# Patient Record
Sex: FEMALE | Race: WHITE | NOT HISPANIC OR LATINO | Employment: UNEMPLOYED | URBAN - METROPOLITAN AREA
[De-identification: names, ages, dates, MRNs, and addresses within clinical notes are randomized per-mention and may not be internally consistent; named-entity substitution may affect disease eponyms.]

---

## 2020-09-08 ENCOUNTER — HOSPITAL ENCOUNTER (OUTPATIENT)
Dept: RADIOLOGY | Facility: MEDICAL CENTER | Age: 65
End: 2020-09-08
Payer: MEDICARE

## 2020-09-09 ENCOUNTER — HOSPITAL ENCOUNTER (OUTPATIENT)
Dept: RADIOLOGY | Facility: MEDICAL CENTER | Age: 65
End: 2020-09-09
Payer: MEDICARE

## 2020-09-09 ENCOUNTER — OFFICE VISIT (OUTPATIENT)
Dept: PULMONOLOGY | Facility: HOSPICE | Age: 65
End: 2020-09-09
Payer: MEDICARE

## 2020-09-09 ENCOUNTER — HOSPITAL ENCOUNTER (OUTPATIENT)
Dept: LAB | Facility: MEDICAL CENTER | Age: 65
End: 2020-09-09
Attending: INTERNAL MEDICINE
Payer: MEDICARE

## 2020-09-09 VITALS
OXYGEN SATURATION: 96 % | WEIGHT: 124 LBS | BODY MASS INDEX: 22.82 KG/M2 | HEIGHT: 62 IN | HEART RATE: 73 BPM | DIASTOLIC BLOOD PRESSURE: 72 MMHG | SYSTOLIC BLOOD PRESSURE: 106 MMHG

## 2020-09-09 DIAGNOSIS — Z87.09 HISTORY OF COPD: ICD-10-CM

## 2020-09-09 DIAGNOSIS — Z72.0 TOBACCO ABUSE DISORDER: ICD-10-CM

## 2020-09-09 DIAGNOSIS — R06.02 SHORTNESS OF BREATH: ICD-10-CM

## 2020-09-09 DIAGNOSIS — J96.11 CHRONIC HYPOXEMIC RESPIRATORY FAILURE (HCC): ICD-10-CM

## 2020-09-09 LAB
COVID ORDER STATUS COVID19: NORMAL
SARS-COV-2 RNA RESP QL NAA+PROBE: NOTDETECTED
SPECIMEN SOURCE: NORMAL

## 2020-09-09 PROCEDURE — 99358 PROLONG SERVICE W/O CONTACT: CPT | Mod: 25 | Performed by: INTERNAL MEDICINE

## 2020-09-09 PROCEDURE — 99204 OFFICE O/P NEW MOD 45 MIN: CPT | Mod: 25 | Performed by: INTERNAL MEDICINE

## 2020-09-09 PROCEDURE — 94761 N-INVAS EAR/PLS OXIMETRY MLT: CPT | Performed by: INTERNAL MEDICINE

## 2020-09-09 PROCEDURE — U0003 INFECTIOUS AGENT DETECTION BY NUCLEIC ACID (DNA OR RNA); SEVERE ACUTE RESPIRATORY SYNDROME CORONAVIRUS 2 (SARS-COV-2) (CORONAVIRUS DISEASE [COVID-19]), AMPLIFIED PROBE TECHNIQUE, MAKING USE OF HIGH THROUGHPUT TECHNOLOGIES AS DESCRIBED BY CMS-2020-01-R: HCPCS

## 2020-09-09 PROCEDURE — 99406 BEHAV CHNG SMOKING 3-10 MIN: CPT | Performed by: INTERNAL MEDICINE

## 2020-09-09 PROCEDURE — C9803 HOPD COVID-19 SPEC COLLECT: HCPCS

## 2020-09-09 RX ORDER — OMEPRAZOLE 40 MG/1
40 CAPSULE, DELAYED RELEASE ORAL 2 TIMES DAILY
COMMUNITY
End: 2023-08-07

## 2020-09-09 RX ORDER — NICOTINE 21 MG/24HR
1 PATCH, TRANSDERMAL 24 HOURS TRANSDERMAL EVERY 24 HOURS
Qty: 30 PATCH | Refills: 6 | Status: SHIPPED | OUTPATIENT
Start: 2020-09-09 | End: 2020-12-28

## 2020-09-09 RX ORDER — BUPROPION HYDROCHLORIDE 300 MG/1
300 TABLET ORAL EVERY MORNING
COMMUNITY
End: 2023-08-07

## 2020-09-09 RX ORDER — ROSUVASTATIN CALCIUM 10 MG/1
10 TABLET, COATED ORAL EVERY EVENING
COMMUNITY

## 2020-09-09 RX ORDER — ALENDRONATE SODIUM 70 MG/1
70 TABLET ORAL
COMMUNITY
End: 2023-08-07

## 2020-09-09 RX ORDER — LOSARTAN POTASSIUM 50 MG/1
50 TABLET ORAL DAILY
COMMUNITY
End: 2020-12-28

## 2020-09-09 ASSESSMENT — ENCOUNTER SYMPTOMS
SENSORY CHANGE: 0
WHEEZING: 0
STRIDOR: 0
COUGH: 0
HEADACHES: 0
EYE DISCHARGE: 0
CHILLS: 0
DIARRHEA: 0
SINUS PAIN: 0
MYALGIAS: 0
ABDOMINAL PAIN: 0
LOSS OF CONSCIOUSNESS: 0
VOMITING: 0
ORTHOPNEA: 0
SPUTUM PRODUCTION: 0
SHORTNESS OF BREATH: 1
DIZZINESS: 0
FEVER: 0
FOCAL WEAKNESS: 0
EYE PAIN: 0
SORE THROAT: 0
WEIGHT LOSS: 0
NAUSEA: 0
PSYCHIATRIC NEGATIVE: 1

## 2020-09-09 NOTE — ASSESSMENT & PLAN NOTE
- Patient was advised of importance of smoking cessation and counseled about the importance of smoking cessation for improved morbidity, mortality, and overall well-being. Patient was presented pharmacologic and non-pharmacologic methods for quitting.  - At this time, the patient is interested in quitting.  - already on wellbutrin  - rx nicoderm  - 8min spent counseling

## 2020-09-09 NOTE — ASSESSMENT & PLAN NOTE
- no prior PFTs, will obtain when acute issues stabilize  - imaging from [facility] requested  - resume symbicort, duonebs

## 2020-09-09 NOTE — PROGRESS NOTES
Pulmonary Clinic- Initial Consult    Date of Service: 9/9/2020    Referring Physician: García Todd M.D.    Reason for Consult: Dyspnea on exertion    Chief Complaint:   Chief Complaint   Patient presents with   • Establish Care     Referred by Dr García Todd for COPD/Dyspnea on Exertion   • Other     CXR 12/04/2019   • Letter for School/Work     To go back to work     HPI:   Estefany Cooper is a very pleasant 65 y.o. female active tobacco smoker 40 pack years, 1 PPD who is followed by García Todd M.D. and is referred to the pulmonary clinic for dyspnea on exertion.    Estefany had an ER visit 8/2020 at [facility] for shortness of breath.  Reportedly underwent a CT angiogram, labs and EKG and was told her shortness of breath was due to anxiety.  We have requested these records.  She was prescribed a nebulizer with duo nebs which she used several times with no symptomatic benefit.  Her symptoms eventually resolved until last week, when she developed worsening shortness of breath and cough.  Denies any fevers.  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      She carries a diagnosis of COPD, however has not had any prior PFTs.  She is previously used Symbicort and Ventolin with minimal symptomatic improvement, has been off both for over a year. She continues to smoke about 1 pack/day.      CXR 12/2019 reviewed, unremarkable lung parenchyma    Past medical history otherwise notable for essential hypertension, osteoporosis, history of hepatitis C, parathyroidectomy and GERD.    Functionally, Estefany states she is short of breath with minimal exertion, frequently having to stop to catch her breath at work. She has been excused from work but they are requesting that she returns Friday of this week.    Exacerbations within the past 12 months: 1    MMRC Grade: 3: Stops to catch breath on level ground after 100m  NYHA Class: III: Moderate symptoms with less than normal physical  "activity. Only comfortable at rest    Past Medical History:   Diagnosis Date   • Back pain    • Chickenpox    • COPD (chronic obstructive pulmonary disease) (HCC)    • [place] measles    • hepatitis c    • Hepatitis C 2012   • Hyperlipidemia    • Hypertension    • Insomnia    • Osteoporosis    • Psychiatric disorder     manic depresive   • Shortness of breath    • Thyroid disease 2012   • Weight loss        Past Surgical History:   Procedure Laterality Date   • CHOLECYSTECTOMY     • PARATHYROIDECTOMY     • OTHER ABDOMINAL SURGERY             Social History     Socioeconomic History   • Marital status:      Spouse name: Not on file   • Number of children: Not on file   • Years of education: Not on file   • Highest education level: Not on file   Occupational History   • Not on file   Social Needs   • Financial resource strain: Not on file   • Food insecurity     Worry: Not on file     Inability: Not on file   • Transportation needs     Medical: Not on file     Non-medical: Not on file   Tobacco Use   • Smoking status: Current Every Day Smoker     Packs/day: 1.00     Years: 40.00     Pack years: 40.00     Types: Cigarettes     Start date:    • Smokeless tobacco: Never Used   • Tobacco comment: 1.5 ppd stated up again   Substance and Sexual Activity   • Alcohol use: No     Comment: recovering alcoholic ()   • Drug use: Yes     Comment: \"overtake my clonipin\" noted 12; recovering herion, and pills 12 yrs-vicodin   • Sexual activity: Not on file   Lifestyle   • Physical activity     Days per week: Not on file     Minutes per session: Not on file   • Stress: Not on file   Relationships   • Social connections     Talks on phone: Not on file     Gets together: Not on file     Attends Jehovah's witness service: Not on file     Active member of club or organization: Not on file     Attends meetings of clubs or organizations: Not on file     Relationship status: Not on file   • Intimate " "partner violence     Fear of current or ex partner: Not on file     Emotionally abused: Not on file     Physically abused: Not on file     Forced sexual activity: Not on file   Other Topics Concern   • Not on file   Social History Narrative   • Not on file          History reviewed. No pertinent family history.    Current Outpatient Medications on File Prior to Visit   Medication Sig Dispense Refill   • losartan (COZAAR) 50 MG Tab Take 50 mg by mouth every day.     • rosuvastatin (CRESTOR) 10 MG Tab Take 10 mg by mouth every evening.     • buPROPion (WELLBUTRIN XL) 300 MG XL tablet Take 300 mg by mouth every morning.     • omeprazole (PRILOSEC) 40 MG delayed-release capsule Take 40 mg by mouth 2 times a day.     • alendronate (FOSAMAX) 70 MG Tab Take 70 mg by mouth every 7 days.       No current facility-administered medications on file prior to visit.        Allergies: Nkda [no known drug allergy], Percodan [oxycodone-aspirin], and Vicodin [hydrocodone-acetaminophen]      ROS:   Review of Systems   Constitutional: Negative for chills, fever and weight loss.   HENT: Negative for congestion, sinus pain and sore throat.    Eyes: Negative for pain and discharge.   Respiratory: Positive for shortness of breath. Negative for cough, sputum production, wheezing and stridor.    Cardiovascular: Negative for chest pain, orthopnea and leg swelling.   Gastrointestinal: Negative for abdominal pain, diarrhea, nausea and vomiting.   Genitourinary: Negative for dysuria, frequency and urgency.   Musculoskeletal: Negative for myalgias.   Skin: Negative for rash.   Neurological: Negative for dizziness, sensory change, focal weakness, loss of consciousness and headaches.   Psychiatric/Behavioral: Negative.    All other systems reviewed and are negative.    Vitals:  /72 (BP Location: Left arm, Patient Position: Sitting, BP Cuff Size: Adult)   Pulse 73   Ht 1.575 m (5' 2\")   Wt 56.2 kg (124 lb)   SpO2 96%     Physical " Exam:  Physical Exam  Vitals signs and nursing note reviewed.   Constitutional:       General: She is not in acute distress.     Appearance: Normal appearance. She is well-developed. She is not diaphoretic.   Eyes:      General: No scleral icterus.        Right eye: No discharge.         Left eye: No discharge.      Conjunctiva/sclera: Conjunctivae normal.      Pupils: Pupils are equal, round, and reactive to light.   Neck:      Thyroid: No thyromegaly.      Vascular: No JVD.   Cardiovascular:      Rate and Rhythm: Normal rate and regular rhythm.      Heart sounds: Normal heart sounds. No murmur. No gallop.    Pulmonary:      Effort: Pulmonary effort is normal. No respiratory distress.      Breath sounds: Normal breath sounds. No wheezing or rales.   Abdominal:      General: There is no distension.      Palpations: Abdomen is soft.      Tenderness: There is no abdominal tenderness. There is no guarding.   Musculoskeletal:         General: No tenderness.   Lymphadenopathy:      Cervical: No cervical adenopathy.   Skin:     General: Skin is warm.      Capillary Refill: Capillary refill takes less than 2 seconds.      Findings: No erythema or rash.   Neurological:      Mental Status: She is alert and oriented to person, place, and time.      Cranial Nerves: No cranial nerve deficit.      Sensory: No sensory deficit.      Motor: No abnormal muscle tone.   Psychiatric:         Behavior: Behavior normal.       Laboratory Data:    PFTs as reviewed by me personally show:  None for review at time of consultation    Imaging as reviewed by me personally show:    CXR 12/2019 reviewed, unremarkable lung parenchyma    Assessment/Plan:    Problem List Items Addressed This Visit     Shortness of breath     - chronic likely due to COPD, not on controller inhaler therapy  - acute due to exacerbation, cannot r/o covid at this time  - fortunately no fevers, sputum production and no desaturations on mulit ox in clinic today on  RA    Plan:  - COVID-19 PCR screen  - CXR 2v when covid ruled out  - instructed to resume symbicort 160 and duonebs q4H prn; if covid negative and CXR clear will treat as COPD exacerbation  - records from ER visit at [facility] requested  - She is NOT cleared to return to work until the above can be reviewed, and this was outlined in detail, along with ER precautions         Relevant Orders    DX-CHEST-2 VIEWS    COVID/SARS COV-2 PCR    Multiple Oximetry (Completed)    Tobacco abuse disorder     - Patient was advised of importance of smoking cessation and counseled about the importance of smoking cessation for improved morbidity, mortality, and overall well-being. Patient was presented pharmacologic and non-pharmacologic methods for quitting.  - At this time, the patient is interested in quitting.  - already on wellbutrin  - rx nicoderm  - 8min spent counseling         Relevant Medications    nicotine (NICODERM) 21 MG/24HR PATCH 24 HR    Other Relevant Orders    DX-CHEST-2 VIEWS    COVID/SARS COV-2 PCR    Multiple Oximetry (Completed)    History of COPD     - no prior PFTs, will obtain when acute issues stabilize  - imaging from [facility] requested  - resume symbicort, duonebs         Chronic hypoxemic respiratory failure (HCC)    Relevant Medications    nicotine (NICODERM) 21 MG/24HR PATCH 24 HR         Return in about 4 weeks (around 10/7/2020).     I spent ( > 30) minutes of non face-to-face time reviewing extensive outside medical records regarding this patients workup prior to their visit.   In summary CT angiogram from 8/6/2020 showed few small emphysematous blebs in the right lower lobe and left lower lung.  Otherwise no significant emphysema. Labs reviewed, BNP and troponin wnl.  Review start time 1:45PM, stop time 2:20PM.   This time was independent and separate from the face-to-face encounter.    This note was generated using voice recognition software which has a chance of producing errors of grammar and possibly  content.  I have made every reasonable attempt to find and correct any obvious errors, but it should be expected that some may not be found prior to finalization of this note.  __________  Ron Pepe MD  Pulmonary and Critical Care Medicine  [facility]

## 2020-09-09 NOTE — ASSESSMENT & PLAN NOTE
- chronic likely due to COPD, not on controller inhaler therapy  - acute due to exacerbation, cannot r/o covid at this time  - fortunately no fevers, sputum production and no desaturations on mulit ox in clinic today on RA    Plan:  - COVID-19 PCR screen  - CXR 2v when covid ruled out  - instructed to resume symbicort 160 and duonebs q4H prn; if covid negative and CXR clear will treat as COPD exacerbation  - records from ER visit at [facility] requested  - She is NOT cleared to return to work until the above can be reviewed, and this was outlined in detail, along with ER precautions

## 2020-09-09 NOTE — PROCEDURES
Multi-Ox Readings  Multi Ox #1 Room air   O2 sat % at rest 95   O2 sat % on exertion 93   O2 sat average on exertion     Multi Ox #2     O2 sat % at rest     O2 sat % on exertion     O2 sat average on exertion       Oxygen Use     Oxygen Frequency     Duration of need     Is the patient mobile within the home?     CPAP Use?     BIPAP Use?     Servo Titration

## 2020-09-10 ENCOUNTER — TELEPHONE (OUTPATIENT)
Dept: PULMONOLOGY | Facility: HOSPICE | Age: 65
End: 2020-09-10

## 2020-09-10 DIAGNOSIS — J44.1 COPD WITH ACUTE EXACERBATION (HCC): ICD-10-CM

## 2020-09-10 RX ORDER — IPRATROPIUM BROMIDE AND ALBUTEROL SULFATE 2.5; .5 MG/3ML; MG/3ML
3 SOLUTION RESPIRATORY (INHALATION) EVERY 4 HOURS PRN
Qty: 120 ML | Refills: 11 | Status: SHIPPED | OUTPATIENT
Start: 2020-09-10 | End: 2020-12-28

## 2020-09-10 RX ORDER — PREDNISONE 10 MG/1
TABLET ORAL
Qty: 40 TAB | Refills: 0 | Status: SHIPPED | OUTPATIENT
Start: 2020-09-10 | End: 2020-09-26

## 2020-09-10 NOTE — TELEPHONE ENCOUNTER
COVID testing negative  CXR without infiltrate or effusion  Clinical picture consistent with COPD exacerbation  Rx for pred taper ordered  Reviewed results with patient, instructed to continue inhalers and nebulizer treatments as outlined on her visit yesterday. ER precautions reviewed, all questions answered    We will see patient in 4 weeks    Orders Placed This Encounter   • predniSONE (DELTASONE) 10 MG Tab   • ipratropium-albuterol (DUONEB) 0.5-2.5 (3) MG/3ML nebulizer solution     __________  Ron Pepe MD  Pulmonary and Critical Care Medicine  [facility]

## 2020-10-09 ENCOUNTER — OFFICE VISIT (OUTPATIENT)
Dept: PULMONOLOGY | Facility: HOSPICE | Age: 65
End: 2020-10-09
Payer: MEDICARE

## 2020-10-09 VITALS
BODY MASS INDEX: 22.84 KG/M2 | DIASTOLIC BLOOD PRESSURE: 58 MMHG | SYSTOLIC BLOOD PRESSURE: 96 MMHG | HEART RATE: 69 BPM | OXYGEN SATURATION: 93 % | WEIGHT: 124.13 LBS | HEIGHT: 62 IN

## 2020-10-09 DIAGNOSIS — J96.11 CHRONIC HYPOXEMIC RESPIRATORY FAILURE (HCC): ICD-10-CM

## 2020-10-09 DIAGNOSIS — F99 PSYCHIATRIC DISORDER: ICD-10-CM

## 2020-10-09 DIAGNOSIS — Z72.0 TOBACCO ABUSE DISORDER: ICD-10-CM

## 2020-10-09 DIAGNOSIS — R06.02 SHORTNESS OF BREATH: ICD-10-CM

## 2020-10-09 PROCEDURE — 99214 OFFICE O/P EST MOD 30 MIN: CPT | Mod: 25 | Performed by: INTERNAL MEDICINE

## 2020-10-09 PROCEDURE — 99406 BEHAV CHNG SMOKING 3-10 MIN: CPT | Performed by: INTERNAL MEDICINE

## 2020-10-09 RX ORDER — TIOTROPIUM BROMIDE 18 UG/1
18 CAPSULE ORAL; RESPIRATORY (INHALATION) DAILY
Qty: 30 CAP | Refills: 11 | Status: SHIPPED | OUTPATIENT
Start: 2020-10-09 | End: 2020-12-28

## 2020-10-09 NOTE — PROGRESS NOTES
Pulmonary Clinic Note    Chief Complaint:  Chief Complaint   Patient presents with   • Follow-Up     Chronic Hypoxemic respiratory failure. Last seen 09/09/20   • Results     CXR 09/10/20   • Shortness of Breath     Per pt still SOB. Breathing Tx QID is not helping.      HPI:   Estefany Cooper is a very pleasant 65 y.o. female active tobacco smoker 40 pack years, 1 PPD who returns to the pulmonary clinic for dyspnea on exertion.  Initial consultation 9/2020    Estefany carries a diagnosis of COPD, however has not had any prior PFTs.  She has previously used Symbicort and Ventolin with minimal symptomatic improvement, has been off both for over a year. She smokes about 1 pack/day.     She had an ER visit 8/2020 at [facility] for shortness of breath.  Underwent a CT angiogram, which was personally reviewed showing significant respiratory motion artifact, few emphysematous blebs in the right lower lobe otherwise unremarkable. She was told her shortness of breath was due to anxiety, prescribed a nebulizer with duonebs and discharged.  Minimal benefit with nebulizer, symptoms eventually resolved but then returned with worsening shortness of breath and cough.  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     Past medical history otherwise notable for essential hypertension, osteoporosis, history of hepatitis C, parathyroidectomy and GERD.     Functionally, Estefany is short of breath with minimal exertion, frequently having to stop to catch her breath at work.     Exacerbations within the past 12 months: 2  MMRC Grade: 3: Stops to catch breath on level ground after 100m  NYHA Class: III: Moderate symptoms with less than normal physical activity. Only comfortable at rest    Interval events since last visit 9/2020:  On her last clinic visit concern for COPD exacerbation,, testing was negative, CXR without infiltrate or effusion.  Symptoms briefly improved with a course of prednisone, however she is short of breath  "again and tachypneic today in clinic.  Has been compliant with Symbicort 160, using DuoNeb nebulizers 4 times a day with minimal benefit  Has quit smoking, using NicoDerm 21. Already on Wellbutrin  No desaturations on room air multi ox last visit.     Past Medical History:   Diagnosis Date   • Back pain    • Chickenpox    • COPD (chronic obstructive pulmonary disease) (HCC)    • [place] measles    • hepatitis c    • Hepatitis C 2012   • Hyperlipidemia    • Hypertension    • Insomnia    • Osteoporosis    • Psychiatric disorder     manic depresive   • Shortness of breath    • Thyroid disease 2012   • Weight loss        Past Surgical History:   Procedure Laterality Date   • CHOLECYSTECTOMY     • PARATHYROIDECTOMY     • OTHER ABDOMINAL SURGERY             Social History     Socioeconomic History   • Marital status:      Spouse name: Not on file   • Number of children: Not on file   • Years of education: Not on file   • Highest education level: Not on file   Occupational History   • Not on file   Social Needs   • Financial resource strain: Not on file   • Food insecurity     Worry: Not on file     Inability: Not on file   • Transportation needs     Medical: Not on file     Non-medical: Not on file   Tobacco Use   • Smoking status: Former Smoker     Packs/day: 1.00     Years: 40.00     Pack years: 40.00     Types: Cigarettes     Start date:      Quit date: 2020     Years since quittin.0   • Smokeless tobacco: Never Used   • Tobacco comment: 1.5 ppd stated up again   Substance and Sexual Activity   • Alcohol use: No     Comment: recovering alcoholic ()   • Drug use: Yes     Comment: \"overtake my clonipin\" noted 12; recovering herion, and pills 12 yrs-vicodin   • Sexual activity: Not on file   Lifestyle   • Physical activity     Days per week: Not on file     Minutes per session: Not on file   • Stress: Not on file   Relationships   • Social connections     Talks on phone: " Not on file     Gets together: Not on file     Attends Jainism service: Not on file     Active member of club or organization: Not on file     Attends meetings of clubs or organizations: Not on file     Relationship status: Not on file   • Intimate partner violence     Fear of current or ex partner: Not on file     Emotionally abused: Not on file     Physically abused: Not on file     Forced sexual activity: Not on file   Other Topics Concern   • Not on file   Social History Narrative   • Not on file          History reviewed. No pertinent family history.    Current Outpatient Medications on File Prior to Visit   Medication Sig Dispense Refill   • ipratropium-albuterol (DUONEB) 0.5-2.5 (3) MG/3ML nebulizer solution 3 mL by Nebulization route every four hours as needed for Shortness of Breath. 120 mL 11   • losartan (COZAAR) 50 MG Tab Take 50 mg by mouth every day.     • rosuvastatin (CRESTOR) 10 MG Tab Take 10 mg by mouth every evening.     • buPROPion (WELLBUTRIN XL) 300 MG XL tablet Take 300 mg by mouth every morning.     • omeprazole (PRILOSEC) 40 MG delayed-release capsule Take 40 mg by mouth 2 times a day.     • alendronate (FOSAMAX) 70 MG Tab Take 70 mg by mouth every 7 days.     • nicotine (NICODERM) 21 MG/24HR PATCH 24 HR Apply 1 Patch to skin as directed every 24 hours. 30 Patch 6     No current facility-administered medications on file prior to visit.      Allergies: Nkda [no known drug allergy], Percodan [oxycodone-aspirin], and Vicodin [hydrocodone-acetaminophen]    ROS:   Review of Systems   Constitutional: Negative for chills, fever and weight loss.   HENT: Negative for congestion, sinus pain and sore throat.    Eyes: Negative for pain and discharge.   Respiratory: Positive for shortness of breath and wheezing. Negative for cough, sputum production and stridor.    Cardiovascular: Negative for chest pain, orthopnea and leg swelling.   Gastrointestinal: Negative for abdominal pain, diarrhea, nausea and  "vomiting.   Genitourinary: Negative for dysuria, frequency and urgency.   Musculoskeletal: Negative for myalgias.   Skin: Negative for rash.   Neurological: Negative for dizziness, sensory change, focal weakness, loss of consciousness and headaches.   Psychiatric/Behavioral: The patient is nervous/anxious.    All other systems reviewed and are negative.    Vitals:  BP (!) 96/58 (BP Location: Left arm, Patient Position: Sitting, BP Cuff Size: Adult)   Pulse 69   Ht 1.575 m (5' 2\")   Wt 56.3 kg (124 lb 2 oz)   SpO2 93%     Physical Exam:  Physical Exam  Vitals signs and nursing note reviewed.   Constitutional:       General: She is not in acute distress.     Appearance: Normal appearance. She is well-developed. She is not ill-appearing or diaphoretic.      Comments: Restless  Pleasant   Eyes:      General: No scleral icterus.        Right eye: No discharge.         Left eye: No discharge.      Conjunctiva/sclera: Conjunctivae normal.      Pupils: Pupils are equal, round, and reactive to light.   Neck:      Thyroid: No thyromegaly.      Vascular: No JVD.   Cardiovascular:      Rate and Rhythm: Normal rate and regular rhythm.      Heart sounds: Normal heart sounds. No murmur. No gallop.    Pulmonary:      Effort: Pulmonary effort is normal. No respiratory distress.      Breath sounds: Normal breath sounds. No wheezing or rales.   Abdominal:      General: There is no distension.      Palpations: Abdomen is soft.      Tenderness: There is no abdominal tenderness. There is no guarding.   Musculoskeletal:         General: No tenderness.   Lymphadenopathy:      Cervical: No cervical adenopathy.   Skin:     General: Skin is warm.      Capillary Refill: Capillary refill takes less than 2 seconds.      Findings: No erythema or rash.   Neurological:      Mental Status: She is alert and oriented to person, place, and time.      Cranial Nerves: No cranial nerve deficit.      Sensory: No sensory deficit.      Motor: No abnormal " muscle tone.   Psychiatric:      Comments: Restless and fidgety.       Laboratory Data:    PFTs as reviewed by me personally show: None    Imaging as reviewed by me personally show:    CT angiogram [facility] 8/2020: CT angiogram, which was personally reviewed showing significant respiratory motion artifact, few emphysematous blebs in the right lower lobe otherwise unremarkable.    Assessment/Plan:    Problem List Items Addressed This Visit     Shortness of breath     Hx COPD.  No prior PFTs. CT 8/2020 few emphysematous blebs right lower lobe.  Received course of prednisone last visit and reports symptomatic improvement, however now dyspneic/tachypneic again today in clinic. Not hypoxic and no desaturations on multi ox last visit.  Has been compliant with Symbicort 160, DuoNebs 4 times a day.    Plan:  -PFTs  -TTE  -Overnight oximetry  -Continue Symbicort, duonebs, add Spiriva         Tobacco abuse disorder     - Quit 9/2020  - already on wellbutrin  - Cont nicoderm 21  - 6 min spent counseling         Psychiatric disorder     Long hx, currently restless, anxious  Previously on Klonipin, currently on Wellbutrin  R/o organic etiologies of dyspnea first with TTE, PFTs before attributing to somatization         Chronic hypoxemic respiratory failure (HCC)    Relevant Medications    tiotropium (SPIRIVA HANDIHALER) 18 MCG Cap    Other Relevant Orders    Overnight Oximetry    EC-ECHOCARDIOGRAM COMPLETE W/O CONT    PULMONARY FUNCTION TESTS -Test requested: Complete Pulmonary Function Test        Return in about 2 months (around 12/9/2020).     This note was generated using voice recognition software which has a chance of producing errors of grammar and possibly content.  I have made every reasonable attempt to find and correct any obvious errors, but it should be expected that some may not be found prior to finalization of this note.  __________  Ron Pepe MD  Pulmonary and Critical Care Medicine  [facility]

## 2020-10-10 ASSESSMENT — ENCOUNTER SYMPTOMS
EYE PAIN: 0
VOMITING: 0
EYE DISCHARGE: 0
FEVER: 0
ORTHOPNEA: 0
WEIGHT LOSS: 0
LOSS OF CONSCIOUSNESS: 0
HEADACHES: 0
SHORTNESS OF BREATH: 1
SPUTUM PRODUCTION: 0
MYALGIAS: 0
SINUS PAIN: 0
COUGH: 0
WHEEZING: 1
FOCAL WEAKNESS: 0
CHILLS: 0
SENSORY CHANGE: 0
STRIDOR: 0
DIZZINESS: 0
NAUSEA: 0
NERVOUS/ANXIOUS: 1
SORE THROAT: 0
DIARRHEA: 0
ABDOMINAL PAIN: 0

## 2020-10-10 NOTE — ASSESSMENT & PLAN NOTE
Long hx, currently restless, anxious  Previously on Klonipin, currently on Wellbutrin  R/o organic etiologies of dyspnea first with TTE, PFTs before attributing to somatization

## 2020-10-10 NOTE — ASSESSMENT & PLAN NOTE
Hx COPD.  No prior PFTs. CT 8/2020 few emphysematous blebs right lower lobe.  Received course of prednisone last visit and reports symptomatic improvement, however now dyspneic/tachypneic again today in clinic. Not hypoxic and no desaturations on multi ox last visit.  Has been compliant with Symbicort 160, DuoNebs 4 times a day.    Plan:  -PFTs  -TTE  -Overnight oximetry  -Continue Symbicort, duonebs, add Spiriva

## 2020-10-16 ENCOUNTER — TELEPHONE (OUTPATIENT)
Dept: PULMONOLOGY | Facility: HOSPICE | Age: 65
End: 2020-10-16

## 2020-10-16 NOTE — TELEPHONE ENCOUNTER
Patient calling for echo results, these are still in the Preliminary stages. Patient will call back next week to see if they are finalized.

## 2020-10-19 ENCOUNTER — TELEPHONE (OUTPATIENT)
Dept: PULMONOLOGY | Facility: HOSPICE | Age: 65
End: 2020-10-19

## 2020-10-19 NOTE — TELEPHONE ENCOUNTER
The patient is calling for her Echo results.  She has some questions.  Please call the patient at [phone].  Thank you.

## 2020-10-20 NOTE — TELEPHONE ENCOUNTER
The patient called back and is concerned that her echocardiogram is abnormal.  She would like a call back to go over the results.  Please advise, thank you.

## 2020-10-21 NOTE — TELEPHONE ENCOUNTER
Normal ECHO.   The radiologist only noted that patient's respiratory rate was increased during exam but other vital signs stable.

## 2020-10-21 NOTE — TELEPHONE ENCOUNTER
The patient states that the echo is not normal.  She stated that the ejection fraction was 65% and that is not normal she said.  She said she used to work for [employer] and that is what chemo patient's number would read.  Please advise, thank you.

## 2020-10-22 ENCOUNTER — TELEPHONE (OUTPATIENT)
Dept: PULMONOLOGY | Facility: HOSPICE | Age: 65
End: 2020-10-22

## 2020-10-22 NOTE — TELEPHONE ENCOUNTER
Caller: Estefany    Phone number: [phone] (home)     Message: Pt called and l/m. ECHO was normal pt was told and she was told it can cause SOB.  That is what she has been having.  Can this be the reason for her SOB?  The inhalers is not helping.     Please advise.

## 2020-10-23 NOTE — TELEPHONE ENCOUNTER
[facility] the [facility] know what Em Crespo said regarding her echo results.

## 2020-10-28 ENCOUNTER — APPOINTMENT (OUTPATIENT)
Dept: SLEEP MEDICINE | Facility: MEDICAL CENTER | Age: 65
End: 2020-10-28
Attending: INTERNAL MEDICINE
Payer: MEDICARE

## 2020-11-04 ENCOUNTER — TELEPHONE (OUTPATIENT)
Dept: SLEEP MEDICINE | Facility: MEDICAL CENTER | Age: 65
End: 2020-11-04

## 2020-11-04 NOTE — TELEPHONE ENCOUNTER
Caller: Estefany    Phone number: [phone] (home)     Message: Pt called and l/m. Her Breathing issues are getting worse. Rapid respirations.  Need to know if I need to go to Cardiology.  If there is something I can have for her Anxiety?

## 2020-11-04 NOTE — TELEPHONE ENCOUNTER
Called pt and spoke with pt's spouse Sheng. Notified him I was returning his wife's call. He said pt wanted to be seen sooner.  Notified him we do have some available appt's with our PA tomorrow.  He said he will have his wife call back, she took her car for a car wash. Told him, she can call our schedulers to make this appt also.

## 2020-11-06 ENCOUNTER — OFFICE VISIT (OUTPATIENT)
Dept: SLEEP MEDICINE | Facility: MEDICAL CENTER | Age: 65
End: 2020-11-06
Payer: MEDICARE

## 2020-11-06 VITALS
BODY MASS INDEX: 22.82 KG/M2 | SYSTOLIC BLOOD PRESSURE: 110 MMHG | HEIGHT: 62 IN | DIASTOLIC BLOOD PRESSURE: 70 MMHG | HEART RATE: 105 BPM | RESPIRATION RATE: 16 BRPM | OXYGEN SATURATION: 95 % | WEIGHT: 124 LBS

## 2020-11-06 DIAGNOSIS — J44.9 CHRONIC OBSTRUCTIVE PULMONARY DISEASE, UNSPECIFIED COPD TYPE (HCC): ICD-10-CM

## 2020-11-06 DIAGNOSIS — Z87.891 FORMER SMOKER: ICD-10-CM

## 2020-11-06 DIAGNOSIS — F41.8 ANXIETY ABOUT HEALTH: ICD-10-CM

## 2020-11-06 DIAGNOSIS — R06.00 DYSPNEA, UNSPECIFIED TYPE: ICD-10-CM

## 2020-11-06 PROCEDURE — 94761 N-INVAS EAR/PLS OXIMETRY MLT: CPT | Performed by: PHYSICIAN ASSISTANT

## 2020-11-06 PROCEDURE — 99213 OFFICE O/P EST LOW 20 MIN: CPT | Performed by: PHYSICIAN ASSISTANT

## 2020-11-06 ASSESSMENT — ENCOUNTER SYMPTOMS
INSOMNIA: 1
WHEEZING: 0
SINUS PAIN: 0
SPUTUM PRODUCTION: 0
TREMORS: 0
HEADACHES: 1
SHORTNESS OF BREATH: 1
COUGH: 0
FEVER: 0
WEIGHT LOSS: 0
HEARTBURN: 0
DIZZINESS: 1
PALPITATIONS: 0
CHILLS: 0

## 2020-11-06 NOTE — PATIENT INSTRUCTIONS
1-reviewed with Dr. Pepe  2-need PFT results and overnight oximetry  3-repeat ambulating oximetry normal as was CT and echocardiogram  4-obtain ABG   5-follow up appointment with Dr. Pepe to review results

## 2020-11-06 NOTE — TELEPHONE ENCOUNTER
Note     Ron Pepe M.D.  You; Susan Vidal P.A.-C. 19 hours ago (7:43 PM)      Seeing Susan tomorrow.   Echo normal   Needs PFTs and OPO   Very anxious pt, if above normal, would get ABG and if also normal, psych referral    Message text                   Pt was seen. Susan Vidal PA-C ordered ABG. OPO scheduled 11/12/2020, PFT 12/04/2020

## 2020-11-06 NOTE — PROGRESS NOTES
CC: Concern regarding echo results    HPI:  Estefany Cooper is a 65 y.o. year old female here today for follow-up on COPD, chronic hypoxic respiratory failure.  Last seen in clinic 10/9/2020 by Dr. Pepe.  Patient is a former smoker, quit 9/21/2020, 40-pack-year history.  Continued abstinence advised.    Pertinent past medical history includes hyperparathyroidism, hep C, GERD, hypertension, manic depression, insomnia.  Patient reports recent emergency department visit where she was told her shortness of breath was primarily due to anxiety.  Was prescribed a nebulizer at that time, reports minimal benefit from DuoNeb.  Also reports not being able to work due to her breathing problems.    Reviewed in clinic vitals including blood pressure 110/70, heart rate of 105, respiratory rate of 28, O2 sat of 95% and BMI of 22.68 kg/m².    Reviewed home medication regimen which includes Spiriva, DuoNeb, omeprazole, nicotine patch.  Patient previously used Symbicort with minimal symptom improvement.    Reviewed most recent imaging including echocardiogram obtained 10/15/2020 demonstrating normal left ventricular size, wall thickness, systolic and diastolic function.  LVEF estimated 65%, normal right ventricular size and systolic function.  Estimated RVSP of 25 mmHg.  Patient respiratory rate was noted to be 34-38 at that time.     Chest x-ray obtained 9/9/2020 demonstrated hyperinflation, mild kyphoscoliosis, flattened diaphragms, no acute cardiopulmonary process.    CT-CTA scan obtained 9/9/2020 demonstrated no pulmonary embolus, no consolidation, no aortic dissection, few small emphysematous blebs right lower lobe, no thoracic adenopathy.    Review of Systems   Constitutional: Positive for malaise/fatigue. Negative for chills, fever and weight loss.   HENT: Negative for congestion, hearing loss, nosebleeds, sinus pain and tinnitus.    Respiratory: Positive for shortness of breath. Negative for cough (mild dry cough x 1  "week, infrequent), sputum production and wheezing.    Cardiovascular: Positive for chest pain (ant left upper chest discomfort about 3 x a week). Negative for palpitations and leg swelling.   Gastrointestinal: Negative for heartburn (barretts).        No dentures, trouble swallowing    Neurological: Positive for dizziness (has to lean up against something) and headaches. Negative for tremors.   Psychiatric/Behavioral: The patient has insomnia (takes belsomra).        Past Medical History:   Diagnosis Date   • Back pain    • Chickenpox    • COPD (chronic obstructive pulmonary disease) (HCC)    • Greek measles    • hepatitis c    • Hepatitis C 2012   • Hyperlipidemia    • Hypertension    • Insomnia    • Osteoporosis    • Psychiatric disorder     manic depresive   • Shortness of breath    • Thyroid disease 2012   • Weight loss        Past Surgical History:   Procedure Laterality Date   • CHOLECYSTECTOMY     • PARATHYROIDECTOMY     • OTHER ABDOMINAL SURGERY             No family history on file.    Social History     Socioeconomic History   • Marital status:      Spouse name: Not on file   • Number of children: Not on file   • Years of education: Not on file   • Highest education level: Not on file   Occupational History   • Not on file   Social Needs   • Financial resource strain: Not on file   • Food insecurity     Worry: Not on file     Inability: Not on file   • Transportation needs     Medical: Not on file     Non-medical: Not on file   Tobacco Use   • Smoking status: Former Smoker     Packs/day: 1.00     Years: 40.00     Pack years: 40.00     Types: Cigarettes     Start date:      Quit date: 2020     Years since quittin.1   • Smokeless tobacco: Never Used   • Tobacco comment: 1.5 ppd stated up again   Substance and Sexual Activity   • Alcohol use: No     Comment: recovering alcoholic ()   • Drug use: Yes     Comment: \"overtake my clonipin\" noted 12; recovering " "herion, and pills 12 yrs-vicodin   • Sexual activity: Not on file   Lifestyle   • Physical activity     Days per week: Not on file     Minutes per session: Not on file   • Stress: Not on file   Relationships   • Social connections     Talks on phone: Not on file     Gets together: Not on file     Attends Sabianism service: Not on file     Active member of club or organization: Not on file     Attends meetings of clubs or organizations: Not on file     Relationship status: Not on file   • Intimate partner violence     Fear of current or ex partner: Not on file     Emotionally abused: Not on file     Physically abused: Not on file     Forced sexual activity: Not on file   Other Topics Concern   • Not on file   Social History Narrative   • Not on file       Allergies as of 11/06/2020 - Reviewed 11/06/2020   Allergen Reaction Noted   • Nkda [no known drug allergy]  01/04/2008   • Percodan [oxycodone-aspirin] Nausea 05/02/2012   • Vicodin [hydrocodone-acetaminophen] Nausea 05/02/2012        @Vital signs for this encounter:  Vitals:    11/06/20 1422   Height: 1.575 m (5' 2\")   Weight: 56.2 kg (124 lb)   Weight % change since last entry.: 0 %   BP: 110/70   Pulse: (!) 105   BMI (Calculated): 22.68   Resp: 16       Current medications as of today   Current Outpatient Medications   Medication Sig Dispense Refill   • tiotropium (SPIRIVA HANDIHALER) 18 MCG Cap Inhale 1 Cap by mouth every day. 30 Cap 11   • ipratropium-albuterol (DUONEB) 0.5-2.5 (3) MG/3ML nebulizer solution 3 mL by Nebulization route every four hours as needed for Shortness of Breath. 120 mL 11   • losartan (COZAAR) 50 MG Tab Take 50 mg by mouth every day.     • rosuvastatin (CRESTOR) 10 MG Tab Take 10 mg by mouth every evening.     • buPROPion (WELLBUTRIN XL) 300 MG XL tablet Take 300 mg by mouth every morning.     • omeprazole (PRILOSEC) 40 MG delayed-release capsule Take 40 mg by mouth 2 times a day.     • alendronate (FOSAMAX) 70 MG Tab Take 70 mg by mouth " every 7 days.     • nicotine (NICODERM) 21 MG/24HR PATCH 24 HR Apply 1 Patch to skin as directed every 24 hours. 30 Patch 6     No current facility-administered medications for this visit.          Physical Exam:   Gen:           Alert and oriented, anxious, restless  Eyes:          sclere white, conjunctive moist.  Hearing:     Grossly intact.  Dentition:    Fair dentition.  Oropharynx:   Tongue normal, posterior pharynx without erythema or exudate.  Neck:        Supple, trachea midline, no masses.  Respiratory Effort: Tachypneic, + accessory muscle use  Lung Auscultation:      Diminished; no rales, rhonchi or wheezing.  CV:            Regular rate and rhythm. No edema. No murmurs, rubs or gallops.  Digits, Nails, Ext: No clubbing, cyanosis, petechiae, or nodes.   Skin:        No rashes, lesions or ulcers noted on back or exposed skin    surfaces.                     Assessment:  1. Chronic obstructive pulmonary disease, unspecified COPD type (HCC)     2. Dyspnea, unspecified type  Multiple Oximetry    Multiple Oximetry    ARTERIAL BLOOD GAS   3. Anxiety about health         Immunizations:    Flu: 10/9/2020  Pneumovax 23: Recommended/deferred  Prevnar 13: Deferred    Plan:  65 y.o. year old female here today for follow-up on COPD, chronic respiratory failure.  Last seen in clinic 10/9/2020 by Dr. Pepe.  Patient is a former smoker, quit 9/21/2020, 40-pack-year history.    Former smoker: Recently quit, continued abstinence advised.     Pertinent past medical history includes hyperparathyroidism, hep C, GERD, hypertension, manic depression, insomnia.  Patient reports recent emergency department visit where she was told her shortness of breath was primarily due to anxiety.  Was prescribed a nebulizer at that time, reports minimal benefit from DuoNeb.  Also reports not being able to work due to her breathing problems.    Reviewed in clinic vitals including blood pressure 110/70, heart rate of 105, respiratory rate of  28, O2 sat of 95% and BMI of 22.68 kg/m².    Dyspnea: Patient did not desat with ambulation in clinic.  Case reviewed with Dr. Pepe.  ABG ordered.    COPD: Continue current regimen    Reviewed home medication regimen which includes Spiriva, DuoNeb, omeprazole, nicotine patch.  Patient previously used Symbicort with minimal symptom improvement.    Reviewed most recent imaging including echocardiogram obtained 10/15/2020 demonstrating normal left ventricular size, wall thickness, systolic and diastolic function.  LVEF estimated 65%, normal right ventricular size and systolic function.  Estimated RVSP of 25 mmHg.  Patient respiratory rate was noted to be 34-38 at that time.     Chest x-ray obtained 9/9/2020 demonstrated hyperinflation, mild kyphoscoliosis, flattened diaphragms, no acute cardiopulmonary process.    CT-CTA scan obtained 9/9/2020 demonstrated no pulmonary embolus, no consolidation, no aortic dissection, few small emphysematous blebs right lower lobe, no thoracic adenopathy.    Anxiety about health: Pulmonary function testing still pending.  Patient requested ABG.  Support given.    Reviewed COVID-19 precautions, continue wearing mask in public, social distancing, frequent handwashing, has had flu shot.    Patient to follow-up as scheduled 12/28/2020 with Dr. Pepe.    This dictation was created using voice recognition software. The accuracy of the dictation is limited to the abilities of the software. I expect there may be some errors of grammar and possibly content.

## 2020-11-06 NOTE — TELEPHONE ENCOUNTER
Ron Pepe M.D.  You; Susan Vidal P.A.-C. 19 hours ago (7:43 PM)     Seeing Susan tomorrow.   Echo normal   Needs PFTs and OPO   Very anxious pt, if above normal, would get ABG and if also normal, psych referral    Message text

## 2020-11-12 ENCOUNTER — HOME STUDY (OUTPATIENT)
Dept: SLEEP MEDICINE | Facility: MEDICAL CENTER | Age: 65
End: 2020-11-12
Attending: INTERNAL MEDICINE
Payer: MEDICARE

## 2020-11-12 PROCEDURE — 94762 N-INVAS EAR/PLS OXIMTRY CONT: CPT | Performed by: FAMILY MEDICINE

## 2020-11-13 ENCOUNTER — HOSPITAL ENCOUNTER (OUTPATIENT)
Facility: MEDICAL CENTER | Age: 65
End: 2020-11-13
Attending: PHYSICIAN ASSISTANT
Payer: MEDICARE

## 2020-11-13 DIAGNOSIS — R06.00 DYSPNEA, UNSPECIFIED TYPE: ICD-10-CM

## 2020-11-13 LAB
BASE EXCESS BLDA CALC-SCNC: 0 MMOL/L (ref -4–3)
BODY TEMPERATURE: ABNORMAL CENTIGRADE
HCO3 BLDA-SCNC: 21 MMOL/L (ref 17–25)
PCO2 BLDA: 25.2 MMHG (ref 26–37)
PH BLDA: 7.53 [PH] (ref 7.4–7.5)
PO2 BLDA: 89.1 MMHG (ref 64–87)
SAO2 % BLDA: 97 % (ref 93–99)

## 2020-11-13 PROCEDURE — 82803 BLOOD GASES ANY COMBINATION: CPT

## 2020-11-16 ENCOUNTER — TELEPHONE (OUTPATIENT)
Dept: SLEEP MEDICINE | Facility: MEDICAL CENTER | Age: 65
End: 2020-11-16

## 2020-11-16 NOTE — TELEPHONE ENCOUNTER
"Patient is calling wanting the results of her ABG lab and OPO.   She also states that the respirations that were taken at her last visit are incorrect and that it should have been 36 \"I know how to count my respirations and I know that's not right\"   She also states that she is still short of breath and is wondering if she should continue symbicort and spiriva or start on something different.     "

## 2020-11-17 NOTE — TELEPHONE ENCOUNTER
"Patient contacted, reviewed ABG results, patient reassured that ;\"its nothing metabolic.\"  Reviewed overnight pulse oximetry,  patient did demonstrate brief oxygen desaturations with total time below 90% less than 5 minutes.  Has PFT pending on December 4th and follow up with Dr. Pepe later in December.  Advised to continue maintenance and rescue inhalers.  Patient amenable to this plan.  Note: she is able to speak in full sentences, intermittent rapid respirations noted.   "

## 2020-11-18 NOTE — PROCEDURES
Over Night Pulse Oximetry     Indication:To assess the efficacy of the current pressure .       Impression:   The study was done on RA. The total analyzed time was 8 hrs 0 min. O2 Sat. leora was 82% and mean O2 sat was 90 % and baseline O2 at 92%. O2 sat was below 88% for 5.7 min of the flow evaluation time. Oxygen Desaturation (>=3%) Index was 3.4/hr.      Recommendation:  The O2 leora seems like an artifact. Clinical correlation recommended.

## 2020-12-04 ENCOUNTER — TELEPHONE (OUTPATIENT)
Dept: SLEEP MEDICINE | Facility: MEDICAL CENTER | Age: 65
End: 2020-12-04

## 2020-12-04 ENCOUNTER — NON-PROVIDER VISIT (OUTPATIENT)
Dept: SLEEP MEDICINE | Facility: MEDICAL CENTER | Age: 65
End: 2020-12-04
Attending: INTERNAL MEDICINE
Payer: MEDICARE

## 2020-12-04 VITALS — BODY MASS INDEX: 23.6 KG/M2 | WEIGHT: 125 LBS | HEIGHT: 61 IN

## 2020-12-04 PROCEDURE — 94729 DIFFUSING CAPACITY: CPT | Performed by: INTERNAL MEDICINE

## 2020-12-04 PROCEDURE — 94060 EVALUATION OF WHEEZING: CPT | Performed by: INTERNAL MEDICINE

## 2020-12-04 PROCEDURE — 94726 PLETHYSMOGRAPHY LUNG VOLUMES: CPT | Performed by: INTERNAL MEDICINE

## 2020-12-04 ASSESSMENT — PULMONARY FUNCTION TESTS
FEV1: 2.03
FEV1/FVC: 75
FEV1_PERCENT_CHANGE: 0
FEV1: 2.04
FEV1/FVC_PREDICTED: 79
FEV1/FVC_PERCENT_PREDICTED: 96
FVC_PREDICTED: 2.73
FEV1/FVC: 76
FEV1_PREDICTED: 2.15
FEV1_PERCENT_PREDICTED: 94
FEV1/FVC_PERCENT_PREDICTED: 79
FEV1/FVC_PERCENT_PREDICTED: 95
FEV1/FVC: 75
FVC_LLN: 2.28
FVC: 2.7
FEV1_LLN: 1.80
FEV1/FVC_PERCENT_PREDICTED: 96
FEV1_PERCENT_PREDICTED: 94
FEV1_PERCENT_CHANGE: 0
FVC_PERCENT_PREDICTED: 98
FEV1/FVC: 75.56
FEV1/FVC_PERCENT_LLN: 66
FEV1/FVC_PERCENT_CHANGE: 0
FVC: 2.7
FVC_PERCENT_PREDICTED: 98
FEV1/FVC_PERCENT_PREDICTED: 94

## 2020-12-04 NOTE — PROCEDURES
Tech: Lamar Swann, RRT, CPFT  Tech notes: Good patient effort & cooperation.  Extreme anxiety during entire PFT.  The results of this test meet the ATS/ERS standards for acceptability & reproducibility.  Test was performed on the [employer] Body Plethysmograph-Elite DX system.  Predicted values were GLI-2012 for spirometry, GLI- 2017 for DLCO, ITS for Lung Volumes.  The DLCO was uncorrected for Hgb.  A bronchodilator of Ventolin HFA -2puffs via spacer administered.  DLCO performed during dilation period.    Interpretation:    Lung function testing was completed on December 4, 2020.  Mid flows are low normal at 85% predicted.  FEV1 FVC ratio is mildly reduced at 75%.  FEV1 is normal at 2.03 L, 94% predicted.  Mild hyperinflation is present with residual volume 127% predicted.  Flow volume loop suggests a borderline obstructive defect with coving of the expiratory limb and with hyperinflation present is consistent with a clinical diagnosis presented of reactive airways, patient on maintenance and rescue inhalers  Oxygen transfer was normal and patient effort was excellent

## 2020-12-04 NOTE — TELEPHONE ENCOUNTER
Pt came in office and dropped off [facility] Pt assistance program.        Form completed/signed and faxed.   Scanned into pt's chart.

## 2020-12-18 ENCOUNTER — TELEPHONE (OUTPATIENT)
Dept: SLEEP MEDICINE | Facility: MEDICAL CENTER | Age: 65
End: 2020-12-18

## 2020-12-19 NOTE — TELEPHONE ENCOUNTER
Patient called and wanted a copy of her application for spiriva assistant paperwork mailed to her.     Request completed.

## 2020-12-28 ENCOUNTER — OFFICE VISIT (OUTPATIENT)
Dept: SLEEP MEDICINE | Facility: MEDICAL CENTER | Age: 65
End: 2020-12-28
Payer: MEDICARE

## 2020-12-28 VITALS
WEIGHT: 130.5 LBS | SYSTOLIC BLOOD PRESSURE: 110 MMHG | OXYGEN SATURATION: 95 % | BODY MASS INDEX: 24.64 KG/M2 | HEART RATE: 96 BPM | DIASTOLIC BLOOD PRESSURE: 78 MMHG | HEIGHT: 61 IN

## 2020-12-28 DIAGNOSIS — F99 PSYCHIATRIC DISORDER: ICD-10-CM

## 2020-12-28 DIAGNOSIS — Z87.891 FORMER SMOKER: ICD-10-CM

## 2020-12-28 DIAGNOSIS — R06.02 SHORTNESS OF BREATH: ICD-10-CM

## 2020-12-28 PROCEDURE — 99214 OFFICE O/P EST MOD 30 MIN: CPT | Performed by: INTERNAL MEDICINE

## 2020-12-28 RX ORDER — BUDESONIDE AND FORMOTEROL FUMARATE DIHYDRATE 160; 4.5 UG/1; UG/1
2 AEROSOL RESPIRATORY (INHALATION) 2 TIMES DAILY
COMMUNITY
End: 2020-12-28

## 2020-12-28 ASSESSMENT — ENCOUNTER SYMPTOMS
SHORTNESS OF BREATH: 1
FOCAL WEAKNESS: 0
DIARRHEA: 0
WHEEZING: 1
SPUTUM PRODUCTION: 0
VOMITING: 0
COUGH: 0
SINUS PAIN: 0
STRIDOR: 0
EYE DISCHARGE: 0
EYE PAIN: 0
HEADACHES: 0
NAUSEA: 0
NERVOUS/ANXIOUS: 1
MYALGIAS: 0
ORTHOPNEA: 0
SENSORY CHANGE: 0
ABDOMINAL PAIN: 0
FEVER: 0
SORE THROAT: 0
WEIGHT LOSS: 0
CHILLS: 0
LOSS OF CONSCIOUSNESS: 0
DIZZINESS: 0

## 2020-12-28 NOTE — PROGRESS NOTES
Pulmonary Clinic Note    Chief Complaint:  Chief Complaint   Patient presents with   • COPD     Last seen 11/06/20   • Results     Labs 11/13/20, OPO 11/12/20, PFT 12/04/20     HPI:   Estefany Cooper is a very pleasant 65 y.o. female active tobacco smoker 40 pack years, 1 PPD who returns to the pulmonary clinic for dyspnea on exertion.  Initial consultation 9/2020.    Estefany carries a diagnosis of COPD, however has not had any prior PFTs.  She has previously used Symbicort and Ventolin with minimal symptomatic improvement, has been off both for over a year. She smokes about 1 pack/day.     She had an ER visit 8/2020 at [facility] for shortness of breath.  Underwent a CT angiogram, which was personally reviewed showing significant respiratory motion artifact, few emphysematous blebs in the right lower lobe otherwise unremarkable. She was told her shortness of breath was due to anxiety, prescribed a nebulizer with duonebs and discharged.  Minimal benefit with nebulizer, symptoms eventually resolved but then returned with worsening shortness of breath and cough.  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     Past medical history otherwise notable for essential hypertension, osteoporosis, history of hepatitis C, parathyroidectomy and GERD.     Functionally, Estefany is short of breath with minimal exertion, frequently having to stop to catch her breath at work.     Exacerbations within the past 12 months: 2  MMRC Grade: 3: Stops to catch breath on level ground after 100m  NYHA Class: III: Moderate symptoms with less than normal physical activity. Only comfortable at rest    Interval events since last visit 10/2020:  TTE 10/15: Completely normal.  Pt was noted to be very tachypneic throughout the exam.  Vital signs stable, adequate saturations, normal heart rate.    OPO 11/2020: transient desat, prob poor contact.   No desaturations on room air multi ox last visit.     ABG: A-a gradient: 3.9.    PFT 12/2020:  "No obstruction, no BD response. Supranormal lung volumes, normal diffusion capacity TLC ~ [facility] no significant dead space.    Symbicort 160, using DuoNeb nebulizers 4 times a day with minimal benefit    Has quit smoking, and abstained. Quit date 2020. On Wellbutrin for mood. Off nicoderm    Reviewed the above results with the patient.  She continues to report significant dyspnea with minimal exertion, mMRC 3.  She is reassured of the above findings being normal.  She finds no symptomatic relief with her COPD inhaler regimen.    Past Medical History:   Diagnosis Date   • Back pain    • Chickenpox    • COPD (chronic obstructive pulmonary disease) (HCC)    • [place] measles    • hepatitis c    • Hepatitis C 2012   • Hyperlipidemia    • Hypertension    • Insomnia    • Osteoporosis    • Psychiatric disorder     manic depresive   • Shortness of breath    • Thyroid disease 2012   • Weight loss        Past Surgical History:   Procedure Laterality Date   • CHOLECYSTECTOMY     • PARATHYROIDECTOMY     • OTHER ABDOMINAL SURGERY             Social History     Socioeconomic History   • Marital status:      Spouse name: Not on file   • Number of children: Not on file   • Years of education: Not on file   • Highest education level: Not on file   Occupational History   • Not on file   Social Needs   • Financial resource strain: Not on file   • Food insecurity     Worry: Not on file     Inability: Not on file   • Transportation needs     Medical: Not on file     Non-medical: Not on file   Tobacco Use   • Smoking status: Former Smoker     Packs/day: 1.00     Years: 40.00     Pack years: 40.00     Types: Cigarettes     Start date:      Quit date: 2020     Years since quittin.2   • Smokeless tobacco: Never Used   • Tobacco comment: 1.5 ppd stated up again   Substance and Sexual Activity   • Alcohol use: No     Comment: recovering alcoholic ()   • Drug use: Yes     Comment: \"overtake my " "clonipin\" noted 5/2/12; recovering herion, and pills 12 yrs-vicodin   • Sexual activity: Not on file   Lifestyle   • Physical activity     Days per week: Not on file     Minutes per session: Not on file   • Stress: Not on file   Relationships   • Social connections     Talks on phone: Not on file     Gets together: Not on file     Attends Zoroastrianism service: Not on file     Active member of club or organization: Not on file     Attends meetings of clubs or organizations: Not on file     Relationship status: Not on file   • Intimate partner violence     Fear of current or ex partner: Not on file     Emotionally abused: Not on file     Physically abused: Not on file     Forced sexual activity: Not on file   Other Topics Concern   • Not on file   Social History Narrative   • Not on file          History reviewed. No pertinent family history.    Current Outpatient Medications on File Prior to Visit   Medication Sig Dispense Refill   • rosuvastatin (CRESTOR) 10 MG Tab Take 10 mg by mouth every evening.     • buPROPion (WELLBUTRIN XL) 300 MG XL tablet Take 300 mg by mouth every morning.     • omeprazole (PRILOSEC) 40 MG delayed-release capsule Take 40 mg by mouth 2 times a day.     • alendronate (FOSAMAX) 70 MG Tab Take 70 mg by mouth every 7 days.       No current facility-administered medications on file prior to visit.      Allergies: Nkda [no known drug allergy], Percodan [oxycodone-aspirin], and Vicodin [hydrocodone-acetaminophen]    ROS:   Review of Systems   Constitutional: Negative for chills, fever and weight loss.   HENT: Negative for congestion, sinus pain and sore throat.    Eyes: Negative for pain and discharge.   Respiratory: Positive for shortness of breath and wheezing. Negative for cough, sputum production and stridor.    Cardiovascular: Negative for chest pain, orthopnea and leg swelling.   Gastrointestinal: Negative for abdominal pain, diarrhea, nausea and vomiting.   Genitourinary: Negative for dysuria, " "frequency and urgency.   Musculoskeletal: Negative for myalgias.   Skin: Negative for rash.   Neurological: Negative for dizziness, sensory change, focal weakness, loss of consciousness and headaches.   Psychiatric/Behavioral: The patient is nervous/anxious.    All other systems reviewed and are negative.    Vitals:  /78 (BP Location: Right arm, Patient Position: Sitting, BP Cuff Size: Adult)   Pulse 96   Ht 1.549 m (5' 1\")   Wt 59.2 kg (130 lb 8 oz)   SpO2 95%     Physical Exam:  Physical Exam  Vitals signs and nursing note reviewed.   Constitutional:       General: She is not in acute distress.     Appearance: Normal appearance. She is well-developed. She is not ill-appearing or diaphoretic.      Comments: Very pleasant   Eyes:      General: No scleral icterus.        Right eye: No discharge.         Left eye: No discharge.      Conjunctiva/sclera: Conjunctivae normal.      Pupils: Pupils are equal, round, and reactive to light.   Neck:      Thyroid: No thyromegaly.      Vascular: No JVD.   Cardiovascular:      Rate and Rhythm: Normal rate and regular rhythm.      Heart sounds: Normal heart sounds. No murmur. No gallop.    Pulmonary:      Effort: Pulmonary effort is normal. No respiratory distress.      Breath sounds: Normal breath sounds. No wheezing or rales.   Abdominal:      General: There is no distension.      Palpations: Abdomen is soft.      Tenderness: There is no abdominal tenderness. There is no guarding.   Musculoskeletal:         General: No tenderness.   Lymphadenopathy:      Cervical: No cervical adenopathy.   Skin:     General: Skin is warm.      Capillary Refill: Capillary refill takes less than 2 seconds.      Findings: No erythema or rash.   Neurological:      Mental Status: She is alert and oriented to person, place, and time.      Cranial Nerves: No cranial nerve deficit.      Sensory: No sensory deficit.      Motor: No abnormal muscle tone.   Psychiatric:      Comments: Slightly " restless.       Laboratory Data:    PFTs as reviewed by me personally show: None  TTE 10/15: Completely normal.  Pt was noted to be very tachypneic throughout the exam.  Vital signs stable, adequate saturations, normal heart rate.    OPO 11/2020: transient desat, prob poor contact.   No desaturations on room air multi ox last visit.     ABG: A-a gradient: 3.9.    PFT 12/2020: No obstruction, no BD response. Supranormal lung volumes, normal diffusion capacity TLC ~ [facility] no significant dead space.    Symbicort 160, using DuoNeb nebulizers 4 times a day with minimal benefit    Imaging as reviewed by me personally show:    CT angiogram [facility] 8/2020: CT angiogram, which was personally reviewed showing significant respiratory motion artifact, few emphysematous blebs in the right lower lobe otherwise unremarkable.    Assessment/Plan:     Problem List Items Addressed This Visit     Shortness of breath     Hx COPD.  Reassuring PFTs. CT 8/2020 few emphysematous blebs right lower lobe.  Extensive work-up outlined above essentially unremarkable.  - As she is had no symptomatic benefit with COPD inhalers nor with courses of prednisone, and every objective measure of hypoxia has been normal, I suspect her shortness of breath is largely psychogenic in nature.  -She was informed of this and relieved.    Plan:  -Recommend she obtain a pulse oximeter for home use to measure her home oxygen levels for reassurance.  -Risks/benefits discussed with regards to continued COPD inhaler regimen, she plans to discontinue all of them and closely monitor symptoms which I am in agreement with.  -Recommend graduated exercise regimen, yoga breathing exercises, and follow-up with psychiatrist.  -Due for Prevnar 13 in 5/2020.  Received PPSV ~15 years ago per pt recall.  -Due for LDCT in 8/2021.         Former smoker     40 pack years  Quit 9/28/2020  Already on Wellbutrin  Now off NicoDerm         Psychiatric disorder     Long hx, still modestly  restless, anxious  Previously on Klonipin, currently on Wellbutrin  Organic etiologies of dyspnea have been ruled out, suspect attributable to somatization, see discussion above             Return in about 5 months (around 5/28/2021).     This note was generated using voice recognition software which has a chance of producing errors of grammar and possibly content.  I have made every reasonable attempt to find and correct any obvious errors, but it should be expected that some may not be found prior to finalization of this note.  __________  Ron Pepe MD  Pulmonary and Critical Care Medicine  [facility]

## 2020-12-28 NOTE — ASSESSMENT & PLAN NOTE
Long hx, still modestly restless, anxious  Previously on Klonipin, currently on Wellbutrin  Organic etiologies of dyspnea have been ruled out, suspect attributable to somatization, see discussion above

## 2020-12-28 NOTE — ASSESSMENT & PLAN NOTE
Hx COPD.  Reassuring PFTs. CT 8/2020 few emphysematous blebs right lower lobe.  Extensive work-up outlined above essentially unremarkable.  - As she is had no symptomatic benefit with COPD inhalers nor with courses of prednisone, and every objective measure of hypoxia has been normal, I suspect her shortness of breath is largely psychogenic in nature.  -She was informed of this and relieved.    Plan:  -Recommend she obtain a pulse oximeter for home use to measure her home oxygen levels for reassurance.  -Risks/benefits discussed with regards to continued COPD inhaler regimen, she plans to discontinue all of them and closely monitor symptoms which I am in agreement with.  -Recommend graduated exercise regimen, yoga breathing exercises, and follow-up with psychiatrist.  -Due for Prevnar 13 in 5/2020.  Received PPSV ~15 years ago per pt recall.  -Due for LDCT in 8/2021.

## 2021-04-21 ENCOUNTER — TELEPHONE (OUTPATIENT)
Dept: SLEEP MEDICINE | Facility: MEDICAL CENTER | Age: 66
End: 2021-04-21

## 2021-04-21 DIAGNOSIS — F17.200 SMOKER: ICD-10-CM

## 2021-04-21 NOTE — TELEPHONE ENCOUNTER
Patient LVM requesting a refill for her Nicotine Patches , Pt states she is now smoking only 5 cigarettes a day and is wondering if she needs a lower dose for her patches .     Patient was last given nicotine (NICODERM) 21 MG/24HR PATCH 24 HR on 09/09/20 by Dr. Pepe     Last Seen 12/28/20 Dr. Pepe    No Pending Follow up Scheduled , patient to Return in about 5 months (around 5/28/2021).     PLEASE ADVISE IF PT WILL CONTINUE CURRENT DOSE OR IF SHE WILL BE GETTING A LOWER DOSE.

## 2021-05-05 RX ORDER — NICOTINE 21 MG/24HR
1 PATCH, TRANSDERMAL 24 HOURS TRANSDERMAL EVERY 24 HOURS
Qty: 14 PATCH | Refills: 0 | Status: SHIPPED | OUTPATIENT
Start: 2021-05-05 | End: 2023-08-07

## 2023-08-07 ENCOUNTER — APPOINTMENT (OUTPATIENT)
Dept: RADIOLOGY | Facility: MEDICAL CENTER | Age: 68
DRG: 917 | End: 2023-08-07
Attending: EMERGENCY MEDICINE
Payer: MEDICARE

## 2023-08-07 ENCOUNTER — HOSPITAL ENCOUNTER (INPATIENT)
Facility: MEDICAL CENTER | Age: 68
LOS: 3 days | DRG: 917 | End: 2023-08-10
Attending: EMERGENCY MEDICINE | Admitting: STUDENT IN AN ORGANIZED HEALTH CARE EDUCATION/TRAINING PROGRAM
Payer: MEDICARE

## 2023-08-07 DIAGNOSIS — I67.1 ANEURYSM, CAROTID ARTERY, INTERNAL: ICD-10-CM

## 2023-08-07 DIAGNOSIS — R41.0 DISORIENTATION: ICD-10-CM

## 2023-08-07 DIAGNOSIS — T14.91XA SUICIDE ATTEMPT (HCC): ICD-10-CM

## 2023-08-07 DIAGNOSIS — J96.11 CHRONIC HYPOXEMIC RESPIRATORY FAILURE (HCC): ICD-10-CM

## 2023-08-07 DIAGNOSIS — T50.902A INTENTIONAL DRUG OVERDOSE, INITIAL ENCOUNTER (HCC): ICD-10-CM

## 2023-08-07 DIAGNOSIS — E86.0 DEHYDRATION: ICD-10-CM

## 2023-08-07 LAB
ALBUMIN SERPL BCP-MCNC: 3.9 G/DL (ref 3.2–4.9)
ALBUMIN/GLOB SERPL: 1.7 G/DL
ALP SERPL-CCNC: 81 U/L (ref 30–99)
ALT SERPL-CCNC: 19 U/L (ref 2–50)
AMPHET UR QL SCN: NEGATIVE
ANION GAP SERPL CALC-SCNC: 13 MMOL/L (ref 7–16)
APAP SERPL-MCNC: <5 UG/ML (ref 10–30)
APTT PPP: 20.5 SEC (ref 24.7–36)
AST SERPL-CCNC: 24 U/L (ref 12–45)
BARBITURATES UR QL SCN: NEGATIVE
BASOPHILS # BLD AUTO: 0.5 % (ref 0–1.8)
BASOPHILS # BLD: 0.04 K/UL (ref 0–0.12)
BENZODIAZ UR QL SCN: NEGATIVE
BILIRUB SERPL-MCNC: 0.2 MG/DL (ref 0.1–1.5)
BUN SERPL-MCNC: 31 MG/DL (ref 8–22)
BZE UR QL SCN: NEGATIVE
CALCIUM ALBUM COR SERPL-MCNC: 9.3 MG/DL (ref 8.5–10.5)
CALCIUM SERPL-MCNC: 9.2 MG/DL (ref 8.5–10.5)
CANNABINOIDS UR QL SCN: NEGATIVE
CARBAMAZEPINE SERPL-MCNC: 19 UG/ML (ref 4–12)
CHLORIDE SERPL-SCNC: 113 MMOL/L (ref 96–112)
CO2 SERPL-SCNC: 15 MMOL/L (ref 20–33)
CREAT SERPL-MCNC: 1.12 MG/DL (ref 0.5–1.4)
EKG IMPRESSION: NORMAL
EOSINOPHIL # BLD AUTO: 0.02 K/UL (ref 0–0.51)
EOSINOPHIL NFR BLD: 0.3 % (ref 0–6.9)
ERYTHROCYTE [DISTWIDTH] IN BLOOD BY AUTOMATED COUNT: 47.2 FL (ref 35.9–50)
ETHANOL BLD-MCNC: <10.1 MG/DL
FENTANYL UR QL: NEGATIVE
GFR SERPLBLD CREATININE-BSD FMLA CKD-EPI: 54 ML/MIN/1.73 M 2
GLOBULIN SER CALC-MCNC: 2.3 G/DL (ref 1.9–3.5)
GLUCOSE SERPL-MCNC: 144 MG/DL (ref 65–99)
HCT VFR BLD AUTO: 43.2 % (ref 37–47)
HGB BLD-MCNC: 14.5 G/DL (ref 12–16)
IMM GRANULOCYTES # BLD AUTO: 0.06 K/UL (ref 0–0.11)
IMM GRANULOCYTES NFR BLD AUTO: 0.8 % (ref 0–0.9)
INR PPP: 1.08 (ref 0.87–1.13)
LYMPHOCYTES # BLD AUTO: 1.47 K/UL (ref 1–4.8)
LYMPHOCYTES NFR BLD: 19.2 % (ref 22–41)
MAGNESIUM SERPL-MCNC: 1.9 MG/DL (ref 1.5–2.5)
MCH RBC QN AUTO: 30.5 PG (ref 27–33)
MCHC RBC AUTO-ENTMCNC: 33.6 G/DL (ref 32.2–35.5)
MCV RBC AUTO: 90.9 FL (ref 81.4–97.8)
METHADONE UR QL SCN: NEGATIVE
MONOCYTES # BLD AUTO: 0.27 K/UL (ref 0–0.85)
MONOCYTES NFR BLD AUTO: 3.5 % (ref 0–13.4)
NEUTROPHILS # BLD AUTO: 5.8 K/UL (ref 1.82–7.42)
NEUTROPHILS NFR BLD: 75.7 % (ref 44–72)
NRBC # BLD AUTO: 0 K/UL
NRBC BLD-RTO: 0 /100 WBC (ref 0–0.2)
OPIATES UR QL SCN: NEGATIVE
OXYCODONE UR QL SCN: NEGATIVE
PCP UR QL SCN: NEGATIVE
PHOSPHATE SERPL-MCNC: 4.8 MG/DL (ref 2.5–4.5)
PLATELET # BLD AUTO: 217 K/UL (ref 164–446)
PMV BLD AUTO: 10.2 FL (ref 9–12.9)
POTASSIUM SERPL-SCNC: 4.4 MMOL/L (ref 3.6–5.5)
PROPOXYPH UR QL SCN: NEGATIVE
PROT SERPL-MCNC: 6.2 G/DL (ref 6–8.2)
PROTHROMBIN TIME: 13.9 SEC (ref 12–14.6)
RBC # BLD AUTO: 4.75 M/UL (ref 4.2–5.4)
SALICYLATES SERPL-MCNC: <1 MG/DL (ref 15–25)
SODIUM SERPL-SCNC: 141 MMOL/L (ref 135–145)
TROPONIN T SERPL-MCNC: 6 NG/L (ref 6–19)
WBC # BLD AUTO: 7.7 K/UL (ref 4.8–10.8)

## 2023-08-07 PROCEDURE — 70496 CT ANGIOGRAPHY HEAD: CPT

## 2023-08-07 PROCEDURE — 700105 HCHG RX REV CODE 258: Performed by: EMERGENCY MEDICINE

## 2023-08-07 PROCEDURE — 85025 COMPLETE CBC W/AUTO DIFF WBC: CPT

## 2023-08-07 PROCEDURE — 93005 ELECTROCARDIOGRAM TRACING: CPT

## 2023-08-07 PROCEDURE — 83735 ASSAY OF MAGNESIUM: CPT

## 2023-08-07 PROCEDURE — 85730 THROMBOPLASTIN TIME PARTIAL: CPT

## 2023-08-07 PROCEDURE — 80156 ASSAY CARBAMAZEPINE TOTAL: CPT

## 2023-08-07 PROCEDURE — 700117 HCHG RX CONTRAST REV CODE 255: Performed by: EMERGENCY MEDICINE

## 2023-08-07 PROCEDURE — 70498 CT ANGIOGRAPHY NECK: CPT

## 2023-08-07 PROCEDURE — 84100 ASSAY OF PHOSPHORUS: CPT

## 2023-08-07 PROCEDURE — 80143 DRUG ASSAY ACETAMINOPHEN: CPT

## 2023-08-07 PROCEDURE — 71045 X-RAY EXAM CHEST 1 VIEW: CPT

## 2023-08-07 PROCEDURE — 99285 EMERGENCY DEPT VISIT HI MDM: CPT

## 2023-08-07 PROCEDURE — 80307 DRUG TEST PRSMV CHEM ANLYZR: CPT

## 2023-08-07 PROCEDURE — 51702 INSERT TEMP BLADDER CATH: CPT

## 2023-08-07 PROCEDURE — 770000 HCHG ROOM/CARE - INTERMEDIATE ICU *

## 2023-08-07 PROCEDURE — 85610 PROTHROMBIN TIME: CPT

## 2023-08-07 PROCEDURE — 80053 COMPREHEN METABOLIC PANEL: CPT

## 2023-08-07 PROCEDURE — 93005 ELECTROCARDIOGRAM TRACING: CPT | Performed by: EMERGENCY MEDICINE

## 2023-08-07 PROCEDURE — 36415 COLL VENOUS BLD VENIPUNCTURE: CPT

## 2023-08-07 PROCEDURE — 84484 ASSAY OF TROPONIN QUANT: CPT

## 2023-08-07 PROCEDURE — 99291 CRITICAL CARE FIRST HOUR: CPT | Performed by: STUDENT IN AN ORGANIZED HEALTH CARE EDUCATION/TRAINING PROGRAM

## 2023-08-07 PROCEDURE — 303105 HCHG CATHETER EXTRA

## 2023-08-07 PROCEDURE — 94760 N-INVAS EAR/PLS OXIMETRY 1: CPT

## 2023-08-07 PROCEDURE — 82077 ASSAY SPEC XCP UR&BREATH IA: CPT

## 2023-08-07 PROCEDURE — 80179 DRUG ASSAY SALICYLATE: CPT

## 2023-08-07 RX ORDER — PROPRANOLOL HYDROCHLORIDE 60 MG/1
60 TABLET ORAL DAILY
COMMUNITY

## 2023-08-07 RX ORDER — SODIUM CHLORIDE, SODIUM LACTATE, POTASSIUM CHLORIDE, CALCIUM CHLORIDE 600; 310; 30; 20 MG/100ML; MG/100ML; MG/100ML; MG/100ML
1000 INJECTION, SOLUTION INTRAVENOUS ONCE
Status: COMPLETED | OUTPATIENT
Start: 2023-08-07 | End: 2023-08-08

## 2023-08-07 RX ORDER — SODIUM CHLORIDE 9 MG/ML
1000 INJECTION, SOLUTION INTRAVENOUS ONCE
Status: COMPLETED | OUTPATIENT
Start: 2023-08-07 | End: 2023-08-07

## 2023-08-07 RX ORDER — LUMATEPERONE 42 MG/1
42 CAPSULE ORAL DAILY
COMMUNITY

## 2023-08-07 RX ORDER — SODIUM CHLORIDE 9 MG/ML
INJECTION, SOLUTION INTRAVENOUS CONTINUOUS
Status: DISCONTINUED | OUTPATIENT
Start: 2023-08-07 | End: 2023-08-08

## 2023-08-07 RX ORDER — SERTRALINE HYDROCHLORIDE 100 MG/1
100 TABLET, FILM COATED ORAL DAILY
COMMUNITY

## 2023-08-07 RX ORDER — DOXEPIN HYDROCHLORIDE 100 MG/1
100 CAPSULE ORAL NIGHTLY
COMMUNITY

## 2023-08-07 RX ORDER — SODIUM CHLORIDE 9 MG/ML
1000 INJECTION, SOLUTION INTRAVENOUS ONCE
Status: COMPLETED | OUTPATIENT
Start: 2023-08-07 | End: 2023-08-08

## 2023-08-07 RX ORDER — SODIUM CHLORIDE, SODIUM LACTATE, POTASSIUM CHLORIDE, CALCIUM CHLORIDE 600; 310; 30; 20 MG/100ML; MG/100ML; MG/100ML; MG/100ML
1000 INJECTION, SOLUTION INTRAVENOUS ONCE
Status: COMPLETED | OUTPATIENT
Start: 2023-08-07 | End: 2023-08-07

## 2023-08-07 RX ORDER — CARBAMAZEPINE 200 MG/1
200 TABLET ORAL 2 TIMES DAILY
COMMUNITY

## 2023-08-07 RX ADMIN — SODIUM CHLORIDE: 9 INJECTION, SOLUTION INTRAVENOUS at 21:31

## 2023-08-07 RX ADMIN — IOHEXOL 100 ML: 350 INJECTION, SOLUTION INTRAVENOUS at 22:05

## 2023-08-07 RX ADMIN — SODIUM CHLORIDE 1000 ML: 9 INJECTION, SOLUTION INTRAVENOUS at 20:00

## 2023-08-07 RX ADMIN — SODIUM CHLORIDE, POTASSIUM CHLORIDE, SODIUM LACTATE AND CALCIUM CHLORIDE 1000 ML: 600; 310; 30; 20 INJECTION, SOLUTION INTRAVENOUS at 21:31

## 2023-08-08 ENCOUNTER — APPOINTMENT (OUTPATIENT)
Dept: RADIOLOGY | Facility: MEDICAL CENTER | Age: 68
DRG: 917 | End: 2023-08-08
Attending: STUDENT IN AN ORGANIZED HEALTH CARE EDUCATION/TRAINING PROGRAM
Payer: MEDICARE

## 2023-08-08 PROBLEM — T14.91XA SUICIDE ATTEMPT (HCC): Status: ACTIVE | Noted: 2023-08-08

## 2023-08-08 PROBLEM — I95.9 HYPOTENSION: Status: ACTIVE | Noted: 2023-08-08

## 2023-08-08 PROBLEM — G92.8 TOXIC METABOLIC ENCEPHALOPATHY: Status: ACTIVE | Noted: 2023-08-08

## 2023-08-08 PROBLEM — E87.20 METABOLIC ACIDOSIS: Status: ACTIVE | Noted: 2023-08-08

## 2023-08-08 PROBLEM — R93.0 ABNORMAL CT SCAN OF HEAD: Status: ACTIVE | Noted: 2023-08-08

## 2023-08-08 LAB
ALBUMIN SERPL BCP-MCNC: 3.4 G/DL (ref 3.2–4.9)
ALBUMIN SERPL BCP-MCNC: 3.4 G/DL (ref 3.2–4.9)
ALBUMIN/GLOB SERPL: 1.7 G/DL
ALBUMIN/GLOB SERPL: 1.7 G/DL
ALP SERPL-CCNC: 69 U/L (ref 30–99)
ALP SERPL-CCNC: 71 U/L (ref 30–99)
ALT SERPL-CCNC: 17 U/L (ref 2–50)
ALT SERPL-CCNC: 18 U/L (ref 2–50)
ANION GAP SERPL CALC-SCNC: 11 MMOL/L (ref 7–16)
ANION GAP SERPL CALC-SCNC: 8 MMOL/L (ref 7–16)
AST SERPL-CCNC: 17 U/L (ref 12–45)
AST SERPL-CCNC: 20 U/L (ref 12–45)
BASOPHILS # BLD AUTO: 0.4 % (ref 0–1.8)
BASOPHILS # BLD: 0.05 K/UL (ref 0–0.12)
BILIRUB SERPL-MCNC: 0.2 MG/DL (ref 0.1–1.5)
BILIRUB SERPL-MCNC: 0.2 MG/DL (ref 0.1–1.5)
BUN SERPL-MCNC: 23 MG/DL (ref 8–22)
BUN SERPL-MCNC: 25 MG/DL (ref 8–22)
CALCIUM ALBUM COR SERPL-MCNC: 8.8 MG/DL (ref 8.5–10.5)
CALCIUM ALBUM COR SERPL-MCNC: 8.8 MG/DL (ref 8.5–10.5)
CALCIUM SERPL-MCNC: 8.3 MG/DL (ref 8.5–10.5)
CALCIUM SERPL-MCNC: 8.3 MG/DL (ref 8.5–10.5)
CARBAMAZEPINE SERPL-MCNC: 14 UG/ML (ref 4–12)
CARBAMAZEPINE SERPL-MCNC: 14 UG/ML (ref 4–12)
CARBAMAZEPINE SERPL-MCNC: 17 UG/ML (ref 4–12)
CARBAMAZEPINE SERPL-MCNC: 17 UG/ML (ref 4–12)
CHLORIDE SERPL-SCNC: 117 MMOL/L (ref 96–112)
CHLORIDE SERPL-SCNC: 118 MMOL/L (ref 96–112)
CO2 SERPL-SCNC: 15 MMOL/L (ref 20–33)
CO2 SERPL-SCNC: 18 MMOL/L (ref 20–33)
CREAT SERPL-MCNC: 0.84 MG/DL (ref 0.5–1.4)
CREAT SERPL-MCNC: 0.85 MG/DL (ref 0.5–1.4)
EKG IMPRESSION: NORMAL
EKG IMPRESSION: NORMAL
EOSINOPHIL # BLD AUTO: 0.01 K/UL (ref 0–0.51)
EOSINOPHIL NFR BLD: 0.1 % (ref 0–6.9)
ERYTHROCYTE [DISTWIDTH] IN BLOOD BY AUTOMATED COUNT: 50.2 FL (ref 35.9–50)
GFR SERPLBLD CREATININE-BSD FMLA CKD-EPI: 75 ML/MIN/1.73 M 2
GFR SERPLBLD CREATININE-BSD FMLA CKD-EPI: 76 ML/MIN/1.73 M 2
GLOBULIN SER CALC-MCNC: 2 G/DL (ref 1.9–3.5)
GLOBULIN SER CALC-MCNC: 2 G/DL (ref 1.9–3.5)
GLUCOSE SERPL-MCNC: 113 MG/DL (ref 65–99)
GLUCOSE SERPL-MCNC: 114 MG/DL (ref 65–99)
HCT VFR BLD AUTO: 40.4 % (ref 37–47)
HGB BLD-MCNC: 12.8 G/DL (ref 12–16)
IMM GRANULOCYTES # BLD AUTO: 0.06 K/UL (ref 0–0.11)
IMM GRANULOCYTES NFR BLD AUTO: 0.5 % (ref 0–0.9)
LYMPHOCYTES # BLD AUTO: 1.94 K/UL (ref 1–4.8)
LYMPHOCYTES NFR BLD: 16.1 % (ref 22–41)
MAGNESIUM SERPL-MCNC: 1.7 MG/DL (ref 1.5–2.5)
MCH RBC QN AUTO: 30 PG (ref 27–33)
MCHC RBC AUTO-ENTMCNC: 31.7 G/DL (ref 32.2–35.5)
MCV RBC AUTO: 94.8 FL (ref 81.4–97.8)
MONOCYTES # BLD AUTO: 0.75 K/UL (ref 0–0.85)
MONOCYTES NFR BLD AUTO: 6.2 % (ref 0–13.4)
NEUTROPHILS # BLD AUTO: 9.27 K/UL (ref 1.82–7.42)
NEUTROPHILS NFR BLD: 76.7 % (ref 44–72)
NRBC # BLD AUTO: 0 K/UL
NRBC BLD-RTO: 0 /100 WBC (ref 0–0.2)
PHOSPHATE SERPL-MCNC: 2.9 MG/DL (ref 2.5–4.5)
PLATELET # BLD AUTO: 178 K/UL (ref 164–446)
PMV BLD AUTO: 10.1 FL (ref 9–12.9)
POTASSIUM SERPL-SCNC: 3.8 MMOL/L (ref 3.6–5.5)
POTASSIUM SERPL-SCNC: 4.1 MMOL/L (ref 3.6–5.5)
PROT SERPL-MCNC: 5.4 G/DL (ref 6–8.2)
PROT SERPL-MCNC: 5.4 G/DL (ref 6–8.2)
RBC # BLD AUTO: 4.26 M/UL (ref 4.2–5.4)
SODIUM SERPL-SCNC: 143 MMOL/L (ref 135–145)
SODIUM SERPL-SCNC: 144 MMOL/L (ref 135–145)
WBC # BLD AUTO: 12.1 K/UL (ref 4.8–10.8)

## 2023-08-08 PROCEDURE — 83735 ASSAY OF MAGNESIUM: CPT

## 2023-08-08 PROCEDURE — 80156 ASSAY CARBAMAZEPINE TOTAL: CPT | Mod: 91

## 2023-08-08 PROCEDURE — 700102 HCHG RX REV CODE 250 W/ 637 OVERRIDE(OP): Performed by: HOSPITALIST

## 2023-08-08 PROCEDURE — 83690 ASSAY OF LIPASE: CPT

## 2023-08-08 PROCEDURE — A9270 NON-COVERED ITEM OR SERVICE: HCPCS | Performed by: HOSPITALIST

## 2023-08-08 PROCEDURE — 84100 ASSAY OF PHOSPHORUS: CPT

## 2023-08-08 PROCEDURE — 700105 HCHG RX REV CODE 258: Performed by: STUDENT IN AN ORGANIZED HEALTH CARE EDUCATION/TRAINING PROGRAM

## 2023-08-08 PROCEDURE — 93005 ELECTROCARDIOGRAM TRACING: CPT | Performed by: STUDENT IN AN ORGANIZED HEALTH CARE EDUCATION/TRAINING PROGRAM

## 2023-08-08 PROCEDURE — 99233 SBSQ HOSP IP/OBS HIGH 50: CPT | Mod: 25 | Performed by: HOSPITALIST

## 2023-08-08 PROCEDURE — 99291 CRITICAL CARE FIRST HOUR: CPT | Performed by: STUDENT IN AN ORGANIZED HEALTH CARE EDUCATION/TRAINING PROGRAM

## 2023-08-08 PROCEDURE — 83605 ASSAY OF LACTIC ACID: CPT

## 2023-08-08 PROCEDURE — 93010 ELECTROCARDIOGRAM REPORT: CPT | Performed by: INTERNAL MEDICINE

## 2023-08-08 PROCEDURE — 85025 COMPLETE CBC W/AUTO DIFF WBC: CPT

## 2023-08-08 PROCEDURE — 770001 HCHG ROOM/CARE - MED/SURG/GYN PRIV*

## 2023-08-08 PROCEDURE — 700111 HCHG RX REV CODE 636 W/ 250 OVERRIDE (IP): Performed by: STUDENT IN AN ORGANIZED HEALTH CARE EDUCATION/TRAINING PROGRAM

## 2023-08-08 PROCEDURE — 700105 HCHG RX REV CODE 258: Mod: JZ | Performed by: EMERGENCY MEDICINE

## 2023-08-08 PROCEDURE — 80053 COMPREHEN METABOLIC PANEL: CPT

## 2023-08-08 PROCEDURE — 71045 X-RAY EXAM CHEST 1 VIEW: CPT

## 2023-08-08 RX ORDER — OXYCODONE HYDROCHLORIDE 5 MG/1
5-10 TABLET ORAL EVERY 4 HOURS PRN
Status: DISCONTINUED | OUTPATIENT
Start: 2023-08-08 | End: 2023-08-10 | Stop reason: HOSPADM

## 2023-08-08 RX ORDER — METHYLPREDNISOLONE ACETATE 40 MG/ML
125 INJECTION, SUSPENSION INTRA-ARTICULAR; INTRALESIONAL; INTRAMUSCULAR; SOFT TISSUE ONCE
Status: DISCONTINUED | OUTPATIENT
Start: 2023-08-09 | End: 2023-08-09

## 2023-08-08 RX ORDER — IPRATROPIUM BROMIDE AND ALBUTEROL SULFATE 2.5; .5 MG/3ML; MG/3ML
3 SOLUTION RESPIRATORY (INHALATION)
Status: DISCONTINUED | OUTPATIENT
Start: 2023-08-08 | End: 2023-08-09

## 2023-08-08 RX ORDER — HYDROMORPHONE HYDROCHLORIDE 1 MG/ML
1 INJECTION, SOLUTION INTRAMUSCULAR; INTRAVENOUS; SUBCUTANEOUS ONCE
Status: COMPLETED | OUTPATIENT
Start: 2023-08-08 | End: 2023-08-08

## 2023-08-08 RX ORDER — FUROSEMIDE 10 MG/ML
40 INJECTION INTRAMUSCULAR; INTRAVENOUS ONCE
Status: COMPLETED | OUTPATIENT
Start: 2023-08-08 | End: 2023-08-08

## 2023-08-08 RX ADMIN — SODIUM CHLORIDE: 9 INJECTION, SOLUTION INTRAVENOUS at 00:09

## 2023-08-08 RX ADMIN — SODIUM CHLORIDE 1000 ML: 9 INJECTION, SOLUTION INTRAVENOUS at 00:13

## 2023-08-08 RX ADMIN — SODIUM CHLORIDE: 9 INJECTION, SOLUTION INTRAVENOUS at 03:01

## 2023-08-08 RX ADMIN — SODIUM CHLORIDE, POTASSIUM CHLORIDE, SODIUM LACTATE AND CALCIUM CHLORIDE 1000 ML: 600; 310; 30; 20 INJECTION, SOLUTION INTRAVENOUS at 00:16

## 2023-08-08 RX ADMIN — HYDROMORPHONE HYDROCHLORIDE 1 MG: 1 INJECTION, SOLUTION INTRAMUSCULAR; INTRAVENOUS; SUBCUTANEOUS at 23:37

## 2023-08-08 RX ADMIN — RIVAROXABAN 10 MG: 10 TABLET, FILM COATED ORAL at 18:16

## 2023-08-08 RX ADMIN — SODIUM CHLORIDE: 9 INJECTION, SOLUTION INTRAVENOUS at 08:10

## 2023-08-08 RX ADMIN — FUROSEMIDE 40 MG: 10 INJECTION INTRAMUSCULAR; INTRAVENOUS at 23:37

## 2023-08-08 RX ADMIN — OXYCODONE HYDROCHLORIDE 10 MG: 5 TABLET ORAL at 11:45

## 2023-08-08 ASSESSMENT — COGNITIVE AND FUNCTIONAL STATUS - GENERAL
MOVING TO AND FROM BED TO CHAIR: A LITTLE
WALKING IN HOSPITAL ROOM: A LITTLE
SUGGESTED CMS G CODE MODIFIER DAILY ACTIVITY: CJ
STANDING UP FROM CHAIR USING ARMS: A LITTLE
DRESSING REGULAR UPPER BODY CLOTHING: A LITTLE
SUGGESTED CMS G CODE MODIFIER MOBILITY: CJ
MOBILITY SCORE: 20
HELP NEEDED FOR BATHING: A LITTLE
CLIMB 3 TO 5 STEPS WITH RAILING: A LITTLE
TOILETING: A LITTLE
DAILY ACTIVITIY SCORE: 21

## 2023-08-08 ASSESSMENT — ENCOUNTER SYMPTOMS
DIZZINESS: 0
LOSS OF CONSCIOUSNESS: 0
COUGH: 0
SORE THROAT: 0
DOUBLE VISION: 0
HEADACHES: 0
ABDOMINAL PAIN: 0
SHORTNESS OF BREATH: 0
CHILLS: 0
BLURRED VISION: 0
FEVER: 0
NAUSEA: 0
VOMITING: 0
BACK PAIN: 0
DIARRHEA: 0
PALPITATIONS: 0

## 2023-08-08 ASSESSMENT — PAIN DESCRIPTION - PAIN TYPE
TYPE: ACUTE PAIN

## 2023-08-08 ASSESSMENT — LIFESTYLE VARIABLES
TOTAL SCORE: 0
CONSUMPTION TOTAL: NEGATIVE
DOES PATIENT WANT TO STOP DRINKING: NO
HAVE YOU EVER FELT YOU SHOULD CUT DOWN ON YOUR DRINKING: NO
ON A TYPICAL DAY WHEN YOU DRINK ALCOHOL HOW MANY DRINKS DO YOU HAVE: 0
TOTAL SCORE: 0
EVER FELT BAD OR GUILTY ABOUT YOUR DRINKING: NO
ALCOHOL_USE: NO
AVERAGE NUMBER OF DAYS PER WEEK YOU HAVE A DRINK CONTAINING ALCOHOL: 0
TOTAL SCORE: 0
HAVE PEOPLE ANNOYED YOU BY CRITICIZING YOUR DRINKING: NO
HOW MANY TIMES IN THE PAST YEAR HAVE YOU HAD 5 OR MORE DRINKS IN A DAY: 0
EVER HAD A DRINK FIRST THING IN THE MORNING TO STEADY YOUR NERVES TO GET RID OF A HANGOVER: NO

## 2023-08-08 ASSESSMENT — PATIENT HEALTH QUESTIONNAIRE - PHQ9
5. POOR APPETITE OR OVEREATING: SEVERAL DAYS
8. MOVING OR SPEAKING SO SLOWLY THAT OTHER PEOPLE COULD HAVE NOTICED. OR THE OPPOSITE, BEING SO FIGETY OR RESTLESS THAT YOU HAVE BEEN MOVING AROUND A LOT MORE THAN USUAL: NOT AT ALL
4. FEELING TIRED OR HAVING LITTLE ENERGY: SEVERAL DAYS
7. TROUBLE CONCENTRATING ON THINGS, SUCH AS READING THE NEWSPAPER OR WATCHING TELEVISION: NOT AT ALL
1. LITTLE INTEREST OR PLEASURE IN DOING THINGS: MORE THAN HALF THE DAYS
2. FEELING DOWN, DEPRESSED, IRRITABLE, OR HOPELESS: MORE THAN HALF THE DAYS
SUM OF ALL RESPONSES TO PHQ9 QUESTIONS 1 AND 2: 4
SUM OF ALL RESPONSES TO PHQ QUESTIONS 1-9: 10
6. FEELING BAD ABOUT YOURSELF - OR THAT YOU ARE A FAILURE OR HAVE LET YOURSELF OR YOUR FAMILY DOWN: SEVERAL DAYS
3. TROUBLE FALLING OR STAYING ASLEEP OR SLEEPING TOO MUCH: SEVERAL DAYS
9. THOUGHTS THAT YOU WOULD BE BETTER OFF DEAD, OR OF HURTING YOURSELF: MORE THAN HALF THE DAYS

## 2023-08-08 ASSESSMENT — FIBROSIS 4 INDEX
FIB4 SCORE: 1.7
FIB4 SCORE: 1.55

## 2023-08-08 NOTE — ASSESSMENT & PLAN NOTE
Reports passing of  recently left suicide note and intentionally consumed doxepin, sertraline, lumateperone, propranolol, caplyta and carbamazepine.  Poison control has been contacted by ER team. Case #[id number]  pt's mental status is now back to normal  Tele benign  cont's to have acidosis which is improving on repeat lab

## 2023-08-08 NOTE — ED NOTES
Bedside report received from YECENIA Abebe. Pt alert and speaking to staff tat this time. Sitter remains bedside.

## 2023-08-08 NOTE — PROGRESS NOTES
4 Eyes Skin Assessment Completed by YECENIA Shaw and YECENIA Merrill.    Head WDL  Ears WDL  Nose WDL  Mouth WDL  Neck WDL  Breast/Chest WDL  Shoulder Blades WDL  Spine WDL  (R) Arm/Elbow/Hand Redness and Blanching  (L) Arm/Elbow/Hand Redness and Blanching  Abdomen WDL  Groin WDL  Scrotum/Coccyx/Buttocks WDL  (R) Leg WDL  (L) Leg WDL  (R) Heel/Foot/Toe Redness and Blanching  (L) Heel/Foot/Toe Redness and Blanching          Devices In Places ECG, Blood Pressure Cuff, and Pulse Ox      Interventions In Place Low Air Loss Mattress    Possible Skin Injury No    Pictures Uploaded Into Epic N/A  Wound Consult Placed N/A  RN Wound Prevention Protocol Ordered No

## 2023-08-08 NOTE — ED PROVIDER NOTES
ER Provider Note    Scribed for Mague Woo D.o. by Janice Fuller. 2023  7:23 PM    Primary Care Provider: García Todd M.D.    CHIEF COMPLAINT  Chief Complaint   Patient presents with    Suicide Attempt     Pt left a suicide note for friend to find and intentionally overdosed on unknown amount/type of substances    ALOC     Pt GCS 11, speaking incomprehensibly.      EXTERNAL RECORDS REVIEWED  Outpatient Notes patient was seen by her PA on 20 for dyspnea     HPI/ROS  LIMITATION TO HISTORY   Select: Altered mental status / Confusion  OUTSIDE HISTORIAN(S):  none    Estefany Cooper is a 68 y.o. female who presents to the ED via EMS following a suicide attempt prior to arrival. Patient had left a suicide note for a friend and overdosed on an unknown amount of medication. She took these pills about two and half hours ago at 5pm. Per EMS note, they found empty bottles of doxepin (filled in 2023), sertraline (filled in May 2023), lumataperone, rosuvastatin, propranolol, caplyta, and carbamazepine. On exam, patient is somnolent and slurring speech. She is difficult to understand.  Her heart rate is low in the high 30s and low 40s.  Patient gives no accurate history.    PAST MEDICAL HISTORY  Past Medical History:   Diagnosis Date    Back pain     Chickenpox     COPD (chronic obstructive pulmonary disease) (HCC)     Spanish measles     hepatitis c     Hepatitis C 2012    Hyperlipidemia     Hypertension     Insomnia     Osteoporosis     Psychiatric disorder     manic depresive    Shortness of breath     Thyroid disease 2012    Weight loss        SURGICAL HISTORY  Past Surgical History:   Procedure Laterality Date    CHOLECYSTECTOMY      PARATHYROIDECTOMY      OTHER ABDOMINAL SURGERY             FAMILY HISTORY  History reviewed. No pertinent family history.    SOCIAL HISTORY   reports that she quit smoking about 2 years ago. Her smoking use included cigarettes. She started  "smoking about 49 years ago. She has a 40.00 pack-year smoking history. She has never used smokeless tobacco. She reports current drug use. She reports that she does not drink alcohol.    CURRENT MEDICATIONS  Previous Medications    CARBAMAZEPINE (TEGRETOL) 200 MG TAB    Take 200 mg by mouth 2 times a day.    DOXEPIN (SINEQUAN) 100 MG CAP    Take 100 mg by mouth every evening.    LUMATEPERONE TOSYLATE (CAPLYTA) 42 MG CAP    Take 42 mg by mouth every day.    PROPRANOLOL (INDERAL) 60 MG TABLET    Take 60 mg by mouth every day.    ROSUVASTATIN (CRESTOR) 10 MG TAB    Take 10 mg by mouth every evening.    SERTRALINE (ZOLOFT) 100 MG TAB    Take 100 mg by mouth every day.       ALLERGIES  Percodan [oxycodone-aspirin] and Vicodin [hydrocodone-acetaminophen]    PHYSICAL EXAM  BP 91/60   Pulse (!) 47   Temp 37.3 °C (99.1 °F) (Temporal)   Resp 20   Ht 1.549 m (5' 1\")   Wt 59 kg (130 lb)   SpO2 90%   BMI 24.56 kg/m²     Constitutional: Patient is well developed, well nourished. No acute distress.   HENT: Normocephalic, atraumatic. Nose normal with no mucosal edema or drainage.  Very dry oral mucosa.  Eyes: pupils 3mm equal round and reactive, EOMI, Conjunctiva without erythema   Neck: Supple  Lymphatic: No lymphadenopathy noted.   Cardiovascular: Bradycardic, Regular rhythm. No murmur  Thorax & Lungs: Clear and equal breath sounds with good excursion. No respiratory distress, no rhonchi, wheezing or rales. No chest tenderness, rashes, lesions or signs of trauma .  Abdomen: Bowel sounds normal in all four quadrants. Soft,nontender, no rebound , guarding, palpable masses.   Skin: Warm, Dry, No erythema, No rashes.   Extremities: no signs of trauma, Peripheral pulses 4/4 No edema, No tenderness  Musculoskeletal: Normal range of motion in all major joints. No tenderness to palpation or major deformities noted.   Neurologic: Alert , slurred garbled speech., Normal motor function, Normal sensory function, No lateralizing or " focal deficits noted. DTR's 4/4 bilaterally.  Psychiatric: slurred speech, altered    DIAGNOSTIC STUDIES  Labs:   Results for orders placed or performed during the hospital encounter of 08/07/23   Urine Drug Screen   Result Value Ref Range    Amphetamines Urine Negative Negative    Barbiturates Negative Negative    Benzodiazepines Negative Negative    Cocaine Metabolite Negative Negative    Fentanyl, Urine Negative Negative    Methadone Negative Negative    Opiates Negative Negative    Oxycodone Negative Negative    Phencyclidine -Pcp Negative Negative    Propoxyphene Negative Negative    Cannabinoid Metab Negative Negative   DIAGNOSTIC ALCOHOL (BA)   Result Value Ref Range    Diagnostic Alcohol <10.1 <10.1 mg/dL   CBC WITH DIFFERENTIAL   Result Value Ref Range    WBC 7.7 4.8 - 10.8 K/uL    RBC 4.75 4.20 - 5.40 M/uL    Hemoglobin 14.5 12.0 - 16.0 g/dL    Hematocrit 43.2 37.0 - 47.0 %    MCV 90.9 81.4 - 97.8 fL    MCH 30.5 27.0 - 33.0 pg    MCHC 33.6 32.2 - 35.5 g/dL    RDW 47.2 35.9 - 50.0 fL    Platelet Count 217 164 - 446 K/uL    MPV 10.2 9.0 - 12.9 fL    Neutrophils-Polys 75.70 (H) 44.00 - 72.00 %    Lymphocytes 19.20 (L) 22.00 - 41.00 %    Monocytes 3.50 0.00 - 13.40 %    Eosinophils 0.30 0.00 - 6.90 %    Basophils 0.50 0.00 - 1.80 %    Immature Granulocytes 0.80 0.00 - 0.90 %    Nucleated RBC 0.00 0.00 - 0.20 /100 WBC    Neutrophils (Absolute) 5.80 1.82 - 7.42 K/uL    Lymphs (Absolute) 1.47 1.00 - 4.80 K/uL    Monos (Absolute) 0.27 0.00 - 0.85 K/uL    Eos (Absolute) 0.02 0.00 - 0.51 K/uL    Baso (Absolute) 0.04 0.00 - 0.12 K/uL    Immature Granulocytes (abs) 0.06 0.00 - 0.11 K/uL    NRBC (Absolute) 0.00 K/uL   APTT   Result Value Ref Range    APTT 20.5 (L) 24.7 - 36.0 sec   PROTHROMBIN TIME (INR)   Result Value Ref Range    PT 13.9 12.0 - 14.6 sec    INR 1.08 0.87 - 1.13   ACETAMINOPHEN   Result Value Ref Range    Acetaminophen -Tylenol <5.0 (L) 10.0 - 30.0 ug/mL   Salicylate   Result Value Ref Range     Salicylates, Quant. <1.0 (L) 15.0 - 25.0 mg/dL   Comp Metabolic Panel   Result Value Ref Range    Sodium 141 135 - 145 mmol/L    Potassium 4.4 3.6 - 5.5 mmol/L    Chloride 113 (H) 96 - 112 mmol/L    Co2 15 (L) 20 - 33 mmol/L    Anion Gap 13.0 7.0 - 16.0    Glucose 144 (H) 65 - 99 mg/dL    Bun 31 (H) 8 - 22 mg/dL    Creatinine 1.12 0.50 - 1.40 mg/dL    Calcium 9.2 8.5 - 10.5 mg/dL    Correct Calcium 9.3 8.5 - 10.5 mg/dL    AST(SGOT) 24 12 - 45 U/L    ALT(SGPT) 19 2 - 50 U/L    Alkaline Phosphatase 81 30 - 99 U/L    Total Bilirubin 0.2 0.1 - 1.5 mg/dL    Albumin 3.9 3.2 - 4.9 g/dL    Total Protein 6.2 6.0 - 8.2 g/dL    Globulin 2.3 1.9 - 3.5 g/dL    A-G Ratio 1.7 g/dL   TROPONIN   Result Value Ref Range    Troponin T 6 6 - 19 ng/L   CARBAMAZEPINE   Result Value Ref Range    Carbamazepine 19.0 (HH) 4.0 - 12.0 ug/mL   ESTIMATED GFR   Result Value Ref Range    GFR (CKD-EPI) 54 (A) >60 mL/min/1.73 m 2   MAGNESIUM   Result Value Ref Range    Magnesium 1.9 1.5 - 2.5 mg/dL   PHOSPHORUS   Result Value Ref Range    Phosphorus 4.8 (H) 2.5 - 4.5 mg/dL   EKG   Result Value Ref Range    Report       [facility] Emergency Dept.    Test Date:  2023  Pt Name:    BA PELAEZ              Department: ER  MRN:        [id number]                      Room:        22  Gender:     Female                       Technician: [id number]  :        1955                   Requested By:ER TRIAGE PROTOCOL  Order #:    [id number]                    Reading MD:    Measurements  Intervals                                Axis  Rate:       47                           P:          26  NV:         197                          QRS:        24  QRSD:       99                           T:          56  QT:         441  QTc:        390    Interpretive Statements  Sinus bradycardia  RSR' in V1 or V2, probably normal variant  Compared to ECG 2012 11:02:17  RSR' in V1 or V2 now present  Sinus rhythm no longer present  T-wave  abnormality no longer present        EKG:   Results for orders placed or performed during the hospital encounter of 23   EKG   Result Value Ref Range    Report       [facility] Emergency Dept.    Test Date:  2023  Pt Name:    BA PELAEZ              Department: ER  MRN:        [id number]                      Room:        22  Gender:     Female                       Technician: [id number]  :        1955                   Requested By:ER TRIAGE PROTOCOL  Order #:    [id number]                    Reading MD:    Measurements  Intervals                                Axis  Rate:       47                           P:          26  OR:         197                          QRS:        24  QRSD:       99                           T:          56  QT:         441  QTc:        390    Interpretive Statements  Sinus bradycardia  RSR' in V1 or V2, probably normal variant  Compared to ECG 2012 11:02:17  RSR' in V1 or V2 now present  Sinus rhythm no longer present  T-wave abnormality no longer present     I have independently interpreted this EKG    Radiology:   The attending emergency physician has independently interpreted the diagnostic imaging associated with this visit and am waiting the final reading from the radiologist.   Preliminary interpretation is a follows: No infiltrates, signs of heart failure.  Radiologist interpretation:   CT-CTA NECK WITH & W/O-POST PROCESSING   Final Result      CT angiogram of the neck within normal limits.      CT-CTA HEAD WITH & W/O-POST PROCESS   Final Result      1.  No evidence of intracranial artery large vessel occlusion.   2.  Possible small 3 mm aneurysm or infundibulum arising from the distal right internal carotid artery. Attention on follow-up imaging is recommended.      DX-CHEST-PORTABLE (1 VIEW)   Final Result      Mild cardiomegaly         COURSE & MEDICAL DECISION MAKING   ED Observation Status? Yes; I am placing the patient in to an  observation status due to a diagnostic uncertainty as well as therapeutic intensity. Patient placed in observation status at 7:33 PM, 8/7/2023.     Observation plan is as follows: Patient was observed one-on-one for suicide precautions, she was given aggressive fluid resuscitation for her hypotension and bradycardia and she responded well.    Upon Reevaluation, the patient's condition has: not improved; and will be escalated to hospitalization.    Patient discharged from ED Observation status at 8/7/2023 8:19 PM    INITIAL ASSESSMENT, COURSE AND PLAN  Care Narrative: Patient was treated aggressively with IV fluids secondary to her clinical dehydration and hypotension with bradycardia.  Altered mental status protocol was followed.  Poison control was notified and recommendations were obtained from them to include supportive care, psychiatric evaluation she responded well to the fluids and is much more stable, she is resting comfortably now and the plan will be to discharge in the morning pending clinical improvement.    HYDRATION: Based on the patient's presentation of Dehydration the patient was given IV fluids. IV Hydration was used because oral hydration was not adequate alone. Upon recheck following hydration, the patient was improved after several liters of IV fluids..    Differential Diagnosis includes but not limited to: drug overdose, suicide attempt     7:24 PM Patient seen and examined at bedside. Ordered for labs and radiology to evaluate.     7:30 PM Called poison control, placed on hold    7:45 PM Removed from hold. Case #[id number]. Supportive care and IV fluids. Cardiac monitoring. Treatment would be if QRS widens bigger than 120 to give sodium bicarbonate QTC widens give magnesium. Give frequent neuro checks.    9:23 PM Spoke to Dr. Forbes, hospitalist, about the patient's case. They will admit for further care and evaluation.    10:16 PM Patient reevaluated at bedside. Patient sleeping.      ADDITIONAL  PROBLEM LIST  #drug overdose    DISPOSITION AND DISCUSSIONS  I have discussed management of the patient with the following physicians and DOMINIC's:  hospitalist, Dr Forbes    Discussion of management with other [facility] or appropriate source(s): Pharmacy        Barriers to care at this time, including but not limited to:  none .     Decision tools and prescription drugs considered including, but not limited to: Hospital admission with aggressive IV fluids, supportive care.    CRITICAL CARE  The very real possibilty of a deterioration of this patient's condition required the highest level of my preparedness for sudden, emergent intervention.  I provided critical care services, which included medication orders, frequent reevaluations of the patient's condition and response to treatment, ordering and reviewing test results, and discussing the case with various consultants.  The critical care time associated with the care of the patient was 60 minutes. Review chart for interventions. This time is exclusive of any other billable procedures.     DISPOSITION:  Patient will be hospitalized by Dr. Forbes in guarded condition.    FINAL DIANGOSIS  1. Intentional drug overdose, initial encounter (HCC)    2. Dehydration    3. Suicide attempt (HCC)    4. Disorientation    Critical care time of 60 minutes.    This dictation has been created using voice recognition software. The accuracy of the dictation is limited by the abilities of the software. I expect there may be some errors of grammar and possibly content. I made every attempt to manually correct the errors within my dictation. However, errors related to voice recognition software may still exist and should be interpreted within the appropriate context.     Janice AGUILA (Kameron), am scribing for, and in the presence of, Mague Woo D.O.    Electronically signed by: Janice Garcia), 8/7/2023    Mague AGUILA D.O. personally performed the services described  in this documentation, as scribed by Janice Fuller in my presence, and it is both accurate and complete.    The note accurately reflects work and decisions made by me.  Mague Woo D.O.  8/8/2023  2:08 AM

## 2023-08-08 NOTE — ED NOTES
Med rec updated and complete. Unable to reviewed allergies.    Pt is unable to partipcate in an interview.    Updated med rec based on prescription bottles at bedside.     Fill dates on bottles between 03/17/2023-06/24/23 for 90 day supplies.        Home pharmacy  [facility] [phone].

## 2023-08-08 NOTE — ASSESSMENT & PLAN NOTE
Reports passing of  recently left suicide note and intentionally consumed doxepin, sertraline, lumateperone, propranolol, caplyta and carbamazepine.  Poison control has been contacted by ER team. Case #[id number]  Aggressive IV Fluids   1:1 constant observation   Psychiatry team consulted
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
## 2023-08-08 NOTE — ASSESSMENT & PLAN NOTE
Possible small 3 mm aneurysm or infundibulum arising from the distal right internal carotid artery.   D/w Neuro IR today: they would like to see in follow up.  Referal placed, pt informed and aware

## 2023-08-08 NOTE — H&P
Hospital Medicine History & Physical Note    Date of Service  8/7/2023    Primary Care Physician  García Todd M.D.    Consultants  critical care    Specialist Names: Dr. Franklin    Code Status  Full Code    Chief Complaint  Chief Complaint   Patient presents with    Suicide Attempt     Pt left a suicide note for friend to find and intentionally overdosed on unknown amount/type of substances    ALOC     Pt GCS 11, speaking incomprehensibly.        History of Presenting Illness  Estefany Cooper is a 68 y.o. female with pmhx of COPD, HTN, HLD who presented 8/7/2023 with aftter suicidal attempt with medications. History is limited as patient is altered and slurring speech. As per chart review EMS found empty bottle of doxepin, sertraline, lumateperone, propranolol, caplyta and carbamazepine. Initially her systolic blood pressure was in the 80's which improved with IV Fluids. Critical care was consulted and recommended IMCU placement with close monitoring. She has been placed on 1:1.     I discussed the plan of care with patient.    Review of Systems  Review of Systems   Unable to perform ROS: Critical illness   Psychiatric/Behavioral:  Positive for suicidal ideas.        Past Medical History   has a past medical history of Back pain, Chickenpox, COPD (chronic obstructive pulmonary disease) (HCC), [place] measles, hepatitis c, Hepatitis C (5/2/2012), Hyperlipidemia, Hypertension, Insomnia, Osteoporosis, Psychiatric disorder, Shortness of breath, Thyroid disease (5/2/2012), and Weight loss.    Surgical History   has a past surgical history that includes other abdominal surgery; parathyroidectomy (1995); and cholecystectomy (1998).     Family History  family history is not on file.   Family history reviewed with patient. There is no family history that is pertinent to the chief complaint.     Social History   reports that she quit smoking about 2 years ago. Her smoking use included cigarettes. She started smoking  about 49 years ago. She has a 40.00 pack-year smoking history. She has never used smokeless tobacco. She reports current drug use. She reports that she does not drink alcohol.    Allergies  Allergies   Allergen Reactions    Percodan [Oxycodone-Aspirin] Nausea    Vicodin [Hydrocodone-Acetaminophen] Nausea       Medications  Prior to Admission Medications   Prescriptions Last Dose Informant Patient Reported? Taking?   Lumateperone Tosylate (CAPLYTA) 42 MG Cap unknown at unknown Rx Bottle (For Med Information) Yes Yes   Sig: Take 42 mg by mouth every day.   carBAMazepine (TEGRETOL) 200 MG Tab unknown at unknown Rx Bottle (For Med Information) Yes Yes   Sig: Take 200 mg by mouth 2 times a day.   doxepin (SINEQUAN) 100 MG Cap unknown at unknown Rx Bottle (For Med Information) Yes Yes   Sig: Take 100 mg by mouth every evening.   propranolol (INDERAL) 60 MG tablet unknown at unknown Rx Bottle (For Med Information) Yes Yes   Sig: Take 60 mg by mouth every day.   rosuvastatin (CRESTOR) 10 MG Tab unknown at unknown Rx Bottle (For Med Information) Yes No   Sig: Take 10 mg by mouth every evening.   sertraline (ZOLOFT) 100 MG Tab unknown at unknown Rx Bottle (For Med Information) Yes Yes   Sig: Take 100 mg by mouth every day.      Facility-Administered Medications: None       Physical Exam  Temp:  [37.3 °C (99.1 °F)] 37.3 °C (99.1 °F)  Pulse:  [47-50] 50  Resp:  [16-50] 31  BP: (83-91)/(53-61) 88/53  SpO2:  [90 %-94 %] 90 %  Blood Pressure : (!) 88/53   Temperature: 37.3 °C (99.1 °F)   Pulse: (!) 50   Respiration: (!) 31   Pulse Oximetry: 90 %       Physical Exam  Vitals and nursing note reviewed.   Constitutional:       Comments: Somnolent but arousable    HENT:      Head: Normocephalic and atraumatic.      Right Ear: Tympanic membrane normal.      Left Ear: Tympanic membrane normal.      Nose: Nose normal.      Mouth/Throat:      Mouth: Mucous membranes are dry.      Pharynx: Oropharynx is clear.   Eyes:      Extraocular  Movements: Extraocular movements intact.      Pupils: Pupils are equal, round, and reactive to light.   Cardiovascular:      Rate and Rhythm: Regular rhythm. Bradycardia present.      Pulses: Normal pulses.      Heart sounds: Normal heart sounds.   Pulmonary:      Effort: Pulmonary effort is normal.      Breath sounds: Normal breath sounds.   Abdominal:      General: Bowel sounds are normal. There is no distension.      Palpations: Abdomen is soft. There is no mass.   Musculoskeletal:         General: Normal range of motion.      Cervical back: Neck supple.   Skin:     General: Skin is warm.      Capillary Refill: Capillary refill takes less than 2 seconds.   Neurological:      Mental Status: She is disoriented.      Comments: Slurred speech    Psychiatric:         Mood and Affect: Mood normal.         Behavior: Behavior normal.         Laboratory:  Recent Labs     08/07/23  1901   WBC 7.7   RBC 4.75   HEMOGLOBIN 14.5   HEMATOCRIT 43.2   MCV 90.9   MCH 30.5   MCHC 33.6   RDW 47.2   PLATELETCT 217   MPV 10.2     Recent Labs     08/07/23  1901   SODIUM 141   POTASSIUM 4.4   CHLORIDE 113*   CO2 15*   GLUCOSE 144*   BUN 31*   CREATININE 1.12   CALCIUM 9.2     Recent Labs     08/07/23  1901   ALTSGPT 19   ASTSGOT 24   ALKPHOSPHAT 81   TBILIRUBIN 0.2   GLUCOSE 144*     Recent Labs     08/07/23  1901   APTT 20.5*   INR 1.08     No results for input(s): NTPROBNP in the last 72 hours.      Recent Labs     08/07/23  1901   TROPONINT 6       Imaging:  DX-CHEST-PORTABLE (1 VIEW)   Final Result      Mild cardiomegaly      CT-CTA HEAD WITH & W/O-POST PROCESS    (Results Pending)   CT-CTA NECK WITH & W/O-POST PROCESSING    (Results Pending)     CTA Head :   IMPRESSION:     1.  No evidence of intracranial artery large vessel occlusion.  2.  Possible small 3 mm aneurysm or infundibulum arising from the distal right internal carotid artery. Attention on follow-up imaging is recommended.    X-Ray:  I have personally reviewed the  images and compared with prior images.  EKG:  I have personally reviewed the images and compared with prior images.    Assessment/Plan:  Justification for Admission Status  I anticipate this patient will require at least two midnights for appropriate medical management, necessitating inpatient admission because of suicide attempt with intentional overdose with home medications including propronalol, carbamezepine, sertraline resulting in hypotension and encephalopathy requiring close monitoring    Patient will need a Intermediate Care (Adult and Pediatrics) bed on MEDICAL service .  The need is secondary to see above.    * Intentional drug overdose, initial encounter (HCC)- (present on admission)  Assessment & Plan  Reports passing of  recently left suicide note and intentionally consumed doxepin, sertraline, lumateperone, propranolol, caplyta and carbamazepine.  Poison control has been contacted by ER team. Case #[id number]  Aggressive IV Fluids   Serial EKG. Monitor QRS if >120 give sodium bicarbonate, QTC widens give magnesium  Carbamezepine level returned elevated, informed posion control recommended trending carbamezepine levels every 4 hours   Neuro checks every 4 hours  1:1 constant observation     Hypotension  Assessment & Plan  Likely from overdose with home prescribed medications   Improving with IV Fluids     Toxic metabolic encephalopathy  Assessment & Plan  Likely From ingestion of multiple medications  Neuro checks every 4 hours   Limit sedatives     Suicide attempt (HCC)  Assessment & Plan  Reports passing of  recently left suicide note and intentionally consumed doxepin, sertraline, lumateperone, propranolol, caplyta and carbamazepine.  Poison control has been contacted by ER team. Case #[id number]  Aggressive IV Fluids   1:1 constant observation   Psychiatric consultation once mental status improves    Chronic hypoxemic respiratory failure (HCC)- (present on admission)  Assessment &  Plan  Oxygen per protocol     HTN (hypertension)- (present on admission)  Assessment & Plan  Patient is hypotensive. Hold all home BP medications     Psychiatric disorder- (present on admission)  Assessment & Plan  Hold home zoloft and tegretol given intentional drug overdose    Abnormal CT scan of head  Assessment & Plan  Possible small 3 mm aneurysm or infundibulum arising from the distal right internal carotid artery.   Follow up with vascular in am      Patient is critically ill.   The patient continues to have: toxic metabolic encephalopathy, intentional drug overdose resulting   The vital organ system that is affected is the: CNS, CVS  If untreated there is a high chance of deterioration into: arrhythmia  And eventually death.   The critical care that I am providing today is: I independently   reviewed of interval medical and surgical history, current medications and outpatient medication reconciliation, interval imaging studies and radiologist interpretation, and interval laboratory values.IV fluid bolus's, frequent hemodynamic monitoring   The critical that has been undertaken is medically complex.   There has been no overlap in critical care time.   Critical Care Time not including procedures: 33 minutes      VTE prophylaxis: SCDs/TEDs

## 2023-08-08 NOTE — PROGRESS NOTES
IMCU Acceptance Note    Called by ERP for admission to IMCU for polydrug overdose with suicide attempt (left a suicide note), empty bottles of doxepin (filled in March 2023), sertraline (filled in May 2023), lumataperone, rosuvastatin, propranolol, caplyta, and carbamazepine.  Patient is altered with slurred speech, does follow simple commands, not in distress.  Mildly bradycardic, clinically quite dehydrated and receiving crystalloid resuscitation, SBP .  ERP discussed with Poison Control Center, continue telemetry monitoring.  Follow-up EKG.  CT head, CTA head and neck unremarkable.  Acetaminophen, salicylates, EtOH, UDS all negative.  Legal hold initiated, sitter at bedside.  Recommend admit to IMCU under the care of the hospitalist.  Critical care available if condition deteriorates and ICU level of care warranted.

## 2023-08-08 NOTE — CONSULTS
Alert Team:    Patient to be admitted to hospital. ED Consult deferred d/t current acuity of pt. IP Psychiatry consult ordered for further evaluation while admitted. Legal hold initiated and 1:1 sitter recommended.

## 2023-08-08 NOTE — PROGRESS NOTES
"Hospital Medicine Daily Progress Note    Date of Service  8/8/2023    Chief Complaint  Estefany Cooper is a 68 y.o. female admitted 8/7/2023 with intentional OD    Hospital Course  69yo PMHx COPD, HTN, HLD who presented after intentional OD of doxepin, sertraline, lumateperone, propranolol, caplyta and carbamezapine    Interval Problem Update  Pt c/o some pain on the right lower rib cage \"I must have done something to it\".      I have discussed this patient's plan of care and discharge plan at IDT rounds today with Case Management, Nursing, Nursing leadership, and other members of the IDT team.    Consultants/Specialty  psychiatry    Code Status  Full Code    Disposition  The patient is not medically cleared for discharge to home or a post-acute facility.      I have placed the appropriate orders for post-discharge needs.    Review of Systems  Review of Systems   Constitutional:  Negative for chills and fever.   HENT:  Negative for nosebleeds and sore throat.    Eyes:  Negative for blurred vision and double vision.   Respiratory:  Negative for cough and shortness of breath.    Cardiovascular:  Positive for chest pain. Negative for palpitations and leg swelling.   Gastrointestinal:  Negative for abdominal pain, diarrhea, nausea and vomiting.   Genitourinary:  Negative for dysuria and urgency.   Musculoskeletal:  Negative for back pain.   Skin:  Negative for rash.   Neurological:  Negative for dizziness, loss of consciousness and headaches.        Physical Exam  Temp:  [36.7 °C (98 °F)-37.3 °C (99.1 °F)] 36.8 °C (98.2 °F)  Pulse:  [47-57] 56  Resp:  [12-50] 15  BP: ()/(51-68) 98/53  SpO2:  [90 %-99 %] 92 %    Physical Exam  Constitutional:       General: She is not in acute distress.     Appearance: Normal appearance. She is well-developed. She is not diaphoretic.   HENT:      Head: Normocephalic and atraumatic.   Neck:      Vascular: No JVD.   Cardiovascular:      Rate and Rhythm: Normal rate and regular " rhythm.      Heart sounds: No murmur heard.  Pulmonary:      Effort: Pulmonary effort is normal. No respiratory distress.      Breath sounds: No stridor. No wheezing or rales.   Abdominal:      Palpations: Abdomen is soft.      Tenderness: There is no abdominal tenderness. There is no guarding or rebound.   Musculoskeletal:         General: No tenderness.      Right lower leg: No edema.      Left lower leg: No edema.   Skin:     General: Skin is warm and dry.      Capillary Refill: Capillary refill takes less than 2 seconds.      Findings: No rash.   Neurological:      Mental Status: She is oriented to person, place, and time.   Psychiatric:         Mood and Affect: Mood normal.         Behavior: Behavior normal.         Thought Content: Thought content normal.         Fluids    Intake/Output Summary (Last 24 hours) at 8/8/2023 0701  Last data filed at 8/8/2023 0400  Gross per 24 hour   Intake 5616.01 ml   Output --   Net 5616.01 ml       Laboratory  Recent Labs     08/07/23  1901 08/08/23  0510   WBC 7.7 12.1*   RBC 4.75 4.26   HEMOGLOBIN 14.5 12.8   HEMATOCRIT 43.2 40.4   MCV 90.9 94.8   MCH 30.5 30.0   MCHC 33.6 31.7*   RDW 47.2 50.2*   PLATELETCT 217 178   MPV 10.2 10.1     Recent Labs     08/07/23  1901 08/08/23  0510   SODIUM 141 143   POTASSIUM 4.4 4.1   CHLORIDE 113* 117*   CO2 15* 15*   GLUCOSE 144* 113*   BUN 31* 25*   CREATININE 1.12 0.85   CALCIUM 9.2 8.3*     Recent Labs     08/07/23 1901   APTT 20.5*   INR 1.08               Imaging  CT-CTA NECK WITH & W/O-POST PROCESSING   Final Result      CT angiogram of the neck within normal limits.      CT-CTA HEAD WITH & W/O-POST PROCESS   Final Result      1.  No evidence of intracranial artery large vessel occlusion.   2.  Possible small 3 mm aneurysm or infundibulum arising from the distal right internal carotid artery. Attention on follow-up imaging is recommended.      DX-CHEST-PORTABLE (1 VIEW)   Final Result      Mild cardiomegaly            Assessment/Plan  * Intentional drug overdose, initial encounter (HCC)- (present on admission)  Assessment & Plan  Reports passing of  recently left suicide note and intentionally consumed doxepin, sertraline, lumateperone, propranolol, caplyta and carbamazepine.  Poison control has been contacted by ER team. Case #[id number]  pt's mental status is now back to normal  Tele benign  cont's to have acidosis which is improving on repeat lab    Metabolic acidosis  Assessment & Plan  Hyperchloremic acidosis  DC NS  Repeat BMP shows CO2 coming back up  Cont to repeat BMP until normalized  Given it's correcting do not think an ABG is warranted    Hypotension  Assessment & Plan  Likely from overdose with home prescribed medications   Improving with IV Fluids     Abnormal CT scan of head  Assessment & Plan  Possible small 3 mm aneurysm or infundibulum arising from the distal right internal carotid artery.   D/w Neuro IR today: they would like to see in follow up.  Referal placed, pt informed and aware    Toxic metabolic encephalopathy  Assessment & Plan  Likely From ingestion of multiple medications  pts mental status is now back to her baseline     Suicide attempt (HCC)  Assessment & Plan  Reports passing of  recently left suicide note and intentionally consumed doxepin, sertraline, lumateperone, propranolol, caplyta and carbamazepine.  Poison control has been contacted by ER team. Case #[id number]  Aggressive IV Fluids   1:1 constant observation   Psychiatry team consulted    Chronic hypoxemic respiratory failure (HCC)- (present on admission)  Assessment & Plan  Oxygen per protocol     HTN (hypertension)- (present on admission)  Assessment & Plan  On propranolol as outpt  hled in setting of hypotension  Cont to [id number]    Psychiatric disorder- (present on admission)  Assessment & Plan  Holding home meds pending Psych eval and their recommendations         VTE prophylaxis:     I have performed a physical exam and  reviewed and updated ROS and Plan today (8/8/2023). In review of yesterday's note (8/7/2023), there are no changes except as documented above.

## 2023-08-08 NOTE — ED NOTES
Pt being taken upstairs at this time by RN via eduardo. Pt is awake and alert, talking to staff, in no apparent distress at time of transfer. Pt's paperwork and belongings sent upstairs with pt and RN.

## 2023-08-08 NOTE — PROGRESS NOTES
Poison control called regarding update on pt mentation and lab values.   Requesting an ABG be performed and further updates from day team.   Phone number: [phone]  Case number: [id number]

## 2023-08-08 NOTE — ASSESSMENT & PLAN NOTE
Hyperchloremic acidosis  DC NS  Repeat BMP shows CO2 coming back up  Cont to repeat BMP until normalized  Given it's correcting do not think an ABG is warranted

## 2023-08-08 NOTE — CARE PLAN
The patient is Watcher - Medium risk of patient condition declining or worsening    Shift Goals  Clinical Goals: Safety, stable hemodynamics  Patient Goals: Go home  Family Goals: MANA    Progress made toward(s) clinical / shift goals:    Problem: Knowledge Deficit - Standard  Goal: Patient and family/care givers will demonstrate understanding of plan of care, disease process/condition, diagnostic tests and medications  Outcome: Progressing     Problem: Provide Safe Environment  Goal: Suicide environmental safety, protocols, policies, and practices will be implemented  Outcome: Progressing     Problem: Psychosocial  Goal: Patient's ability to identify and develop effective coping behaviors will improve  Outcome: Progressing  Goal: Patient's ability to identify and utilize available support systems will improve  Outcome: Progressing     Problem: Depression  Goal: Patient and family/caregiver will verbalize accurate information about at least two of the possible causes of depression, three-four of the signs and symptoms of depression  Outcome: Progressing     Problem: Fall Risk  Goal: Patient will remain free from falls  Outcome: Progressing

## 2023-08-08 NOTE — PROGRESS NOTES
Pharmacy Note: Poison control updated for poly substance OD overdose    Poison control case #:[id number]    Patient took unknown amount of medication in SI attempt. Bottles of Doxepin,sertraline lumataperone, rosuvastatin, propranolol and carbamazepine were found on person.    Labs:  Recent Labs     08/07/23  1901   SODIUM 141   POTASSIUM 4.4   CHLORIDE 113*   CO2 15*   BUN 31*   CREATININE 1.12   GLUCOSE 144*   CALCIUM 9.2   MAGNESIUM 1.9   PHOSPHORUS 4.8*   ASTSGOT 24   ALTSGPT 19   ALBUMIN 3.9   TBILIRUBIN 0.2   INR 1.08            Levels:   08/07/23 20:11   Carbamazepine 19.0 (HH)   Acetaminophen -Tylenol <5.0 (L)   Salicylates, Quant. <1.0 (L)     Recommended Plan:  1. IV fluids for hypotension, if no response and patient remains bradycardic consider epinephrine gtt.  2. EKG monitoring: if QRS is > 120 give bicab. If Qtc > 500 give magnesium.  3. Give benzodiazepines for seizures  4. Supportive care.     David Armstrong, MallyD

## 2023-08-09 ENCOUNTER — HOSPITAL ENCOUNTER (OUTPATIENT)
Dept: RADIOLOGY | Facility: MEDICAL CENTER | Age: 68
End: 2023-08-09
Attending: STUDENT IN AN ORGANIZED HEALTH CARE EDUCATION/TRAINING PROGRAM
Payer: MEDICARE

## 2023-08-09 LAB
ALBUMIN SERPL BCP-MCNC: 3.7 G/DL (ref 3.2–4.9)
ALBUMIN SERPL BCP-MCNC: 3.8 G/DL (ref 3.2–4.9)
ALBUMIN/GLOB SERPL: 1.5 G/DL
ALBUMIN/GLOB SERPL: 1.9 G/DL
ALP SERPL-CCNC: 72 U/L (ref 30–99)
ALP SERPL-CCNC: 84 U/L (ref 30–99)
ALT SERPL-CCNC: 16 U/L (ref 2–50)
ALT SERPL-CCNC: 19 U/L (ref 2–50)
ANION GAP SERPL CALC-SCNC: 10 MMOL/L (ref 7–16)
ANION GAP SERPL CALC-SCNC: 13 MMOL/L (ref 7–16)
AST SERPL-CCNC: 19 U/L (ref 12–45)
AST SERPL-CCNC: 24 U/L (ref 12–45)
BASOPHILS # BLD AUTO: 0.2 % (ref 0–1.8)
BASOPHILS # BLD AUTO: 0.5 % (ref 0–1.8)
BASOPHILS # BLD: 0.03 K/UL (ref 0–0.12)
BASOPHILS # BLD: 0.09 K/UL (ref 0–0.12)
BILIRUB SERPL-MCNC: 0.2 MG/DL (ref 0.1–1.5)
BILIRUB SERPL-MCNC: 0.3 MG/DL (ref 0.1–1.5)
BUN SERPL-MCNC: 18 MG/DL (ref 8–22)
BUN SERPL-MCNC: 20 MG/DL (ref 8–22)
CALCIUM ALBUM COR SERPL-MCNC: 8.7 MG/DL (ref 8.5–10.5)
CALCIUM ALBUM COR SERPL-MCNC: 9 MG/DL (ref 8.5–10.5)
CALCIUM SERPL-MCNC: 8.5 MG/DL (ref 8.5–10.5)
CALCIUM SERPL-MCNC: 8.8 MG/DL (ref 8.5–10.5)
CARBAMAZEPINE SERPL-MCNC: 11 UG/ML (ref 4–12)
CARBAMAZEPINE SERPL-MCNC: 11 UG/ML (ref 4–12)
CHLORIDE SERPL-SCNC: 109 MMOL/L (ref 96–112)
CHLORIDE SERPL-SCNC: 114 MMOL/L (ref 96–112)
CO2 SERPL-SCNC: 17 MMOL/L (ref 20–33)
CO2 SERPL-SCNC: 19 MMOL/L (ref 20–33)
CREAT SERPL-MCNC: 0.78 MG/DL (ref 0.5–1.4)
CREAT SERPL-MCNC: 0.86 MG/DL (ref 0.5–1.4)
EKG IMPRESSION: NORMAL
EKG IMPRESSION: NORMAL
EOSINOPHIL # BLD AUTO: 0.03 K/UL (ref 0–0.51)
EOSINOPHIL # BLD AUTO: 0.15 K/UL (ref 0–0.51)
EOSINOPHIL NFR BLD: 0.2 % (ref 0–6.9)
EOSINOPHIL NFR BLD: 0.9 % (ref 0–6.9)
ERYTHROCYTE [DISTWIDTH] IN BLOOD BY AUTOMATED COUNT: 48.8 FL (ref 35.9–50)
ERYTHROCYTE [DISTWIDTH] IN BLOOD BY AUTOMATED COUNT: 49.6 FL (ref 35.9–50)
GFR SERPLBLD CREATININE-BSD FMLA CKD-EPI: 74 ML/MIN/1.73 M 2
GFR SERPLBLD CREATININE-BSD FMLA CKD-EPI: 83 ML/MIN/1.73 M 2
GLOBULIN SER CALC-MCNC: 2 G/DL (ref 1.9–3.5)
GLOBULIN SER CALC-MCNC: 2.6 G/DL (ref 1.9–3.5)
GLUCOSE SERPL-MCNC: 116 MG/DL (ref 65–99)
GLUCOSE SERPL-MCNC: 142 MG/DL (ref 65–99)
HCT VFR BLD AUTO: 36.7 % (ref 37–47)
HCT VFR BLD AUTO: 41.1 % (ref 37–47)
HGB BLD-MCNC: 12.3 G/DL (ref 12–16)
HGB BLD-MCNC: 13.1 G/DL (ref 12–16)
IMM GRANULOCYTES # BLD AUTO: 0.07 K/UL (ref 0–0.11)
IMM GRANULOCYTES # BLD AUTO: 0.11 K/UL (ref 0–0.11)
IMM GRANULOCYTES NFR BLD AUTO: 0.6 % (ref 0–0.9)
IMM GRANULOCYTES NFR BLD AUTO: 0.6 % (ref 0–0.9)
LACTATE SERPL-SCNC: 1.5 MMOL/L (ref 0.5–2)
LIPASE SERPL-CCNC: 16 U/L (ref 11–82)
LYMPHOCYTES # BLD AUTO: 1.11 K/UL (ref 1–4.8)
LYMPHOCYTES # BLD AUTO: 3.01 K/UL (ref 1–4.8)
LYMPHOCYTES NFR BLD: 17.5 % (ref 22–41)
LYMPHOCYTES NFR BLD: 8.9 % (ref 22–41)
MAGNESIUM SERPL-MCNC: 1.5 MG/DL (ref 1.5–2.5)
MCH RBC QN AUTO: 29.8 PG (ref 27–33)
MCH RBC QN AUTO: 30.9 PG (ref 27–33)
MCHC RBC AUTO-ENTMCNC: 31.9 G/DL (ref 32.2–35.5)
MCHC RBC AUTO-ENTMCNC: 33.5 G/DL (ref 32.2–35.5)
MCV RBC AUTO: 92.2 FL (ref 81.4–97.8)
MCV RBC AUTO: 93.4 FL (ref 81.4–97.8)
MONOCYTES # BLD AUTO: 0.74 K/UL (ref 0–0.85)
MONOCYTES # BLD AUTO: 0.92 K/UL (ref 0–0.85)
MONOCYTES NFR BLD AUTO: 5.4 % (ref 0–13.4)
MONOCYTES NFR BLD AUTO: 5.9 % (ref 0–13.4)
NEUTROPHILS # BLD AUTO: 10.46 K/UL (ref 1.82–7.42)
NEUTROPHILS # BLD AUTO: 12.88 K/UL (ref 1.82–7.42)
NEUTROPHILS NFR BLD: 75.1 % (ref 44–72)
NEUTROPHILS NFR BLD: 84.2 % (ref 44–72)
NRBC # BLD AUTO: 0 K/UL
NRBC # BLD AUTO: 0 K/UL
NRBC BLD-RTO: 0 /100 WBC (ref 0–0.2)
NRBC BLD-RTO: 0 /100 WBC (ref 0–0.2)
PHOSPHATE SERPL-MCNC: 3.2 MG/DL (ref 2.5–4.5)
PLATELET # BLD AUTO: 172 K/UL (ref 164–446)
PLATELET # BLD AUTO: 195 K/UL (ref 164–446)
PMV BLD AUTO: 10.1 FL (ref 9–12.9)
PMV BLD AUTO: 10.4 FL (ref 9–12.9)
POTASSIUM SERPL-SCNC: 3.3 MMOL/L (ref 3.6–5.5)
POTASSIUM SERPL-SCNC: 3.9 MMOL/L (ref 3.6–5.5)
PROT SERPL-MCNC: 5.7 G/DL (ref 6–8.2)
PROT SERPL-MCNC: 6.4 G/DL (ref 6–8.2)
RBC # BLD AUTO: 3.98 M/UL (ref 4.2–5.4)
RBC # BLD AUTO: 4.4 M/UL (ref 4.2–5.4)
SODIUM SERPL-SCNC: 141 MMOL/L (ref 135–145)
SODIUM SERPL-SCNC: 141 MMOL/L (ref 135–145)
WBC # BLD AUTO: 12.4 K/UL (ref 4.8–10.8)
WBC # BLD AUTO: 17.2 K/UL (ref 4.8–10.8)

## 2023-08-09 PROCEDURE — 770001 HCHG ROOM/CARE - MED/SURG/GYN PRIV*

## 2023-08-09 PROCEDURE — 700117 HCHG RX CONTRAST REV CODE 255: Performed by: STUDENT IN AN ORGANIZED HEALTH CARE EDUCATION/TRAINING PROGRAM

## 2023-08-09 PROCEDURE — 85025 COMPLETE CBC W/AUTO DIFF WBC: CPT

## 2023-08-09 PROCEDURE — 80053 COMPREHEN METABOLIC PANEL: CPT

## 2023-08-09 PROCEDURE — A9270 NON-COVERED ITEM OR SERVICE: HCPCS | Mod: JZ | Performed by: STUDENT IN AN ORGANIZED HEALTH CARE EDUCATION/TRAINING PROGRAM

## 2023-08-09 PROCEDURE — 94760 N-INVAS EAR/PLS OXIMETRY 1: CPT

## 2023-08-09 PROCEDURE — 93010 ELECTROCARDIOGRAM REPORT: CPT | Performed by: INTERNAL MEDICINE

## 2023-08-09 PROCEDURE — A9270 NON-COVERED ITEM OR SERVICE: HCPCS | Performed by: HOSPITALIST

## 2023-08-09 PROCEDURE — 80156 ASSAY CARBAMAZEPINE TOTAL: CPT

## 2023-08-09 PROCEDURE — 94640 AIRWAY INHALATION TREATMENT: CPT

## 2023-08-09 PROCEDURE — 700102 HCHG RX REV CODE 250 W/ 637 OVERRIDE(OP): Mod: JZ | Performed by: STUDENT IN AN ORGANIZED HEALTH CARE EDUCATION/TRAINING PROGRAM

## 2023-08-09 PROCEDURE — 84100 ASSAY OF PHOSPHORUS: CPT

## 2023-08-09 PROCEDURE — 93010 ELECTROCARDIOGRAM REPORT: CPT | Mod: 76 | Performed by: INTERNAL MEDICINE

## 2023-08-09 PROCEDURE — 700102 HCHG RX REV CODE 250 W/ 637 OVERRIDE(OP): Performed by: STUDENT IN AN ORGANIZED HEALTH CARE EDUCATION/TRAINING PROGRAM

## 2023-08-09 PROCEDURE — 74177 CT ABD & PELVIS W/CONTRAST: CPT

## 2023-08-09 PROCEDURE — A9270 NON-COVERED ITEM OR SERVICE: HCPCS | Performed by: STUDENT IN AN ORGANIZED HEALTH CARE EDUCATION/TRAINING PROGRAM

## 2023-08-09 PROCEDURE — 700102 HCHG RX REV CODE 250 W/ 637 OVERRIDE(OP): Performed by: HOSPITALIST

## 2023-08-09 PROCEDURE — 83735 ASSAY OF MAGNESIUM: CPT

## 2023-08-09 PROCEDURE — 99231 SBSQ HOSP IP/OBS SF/LOW 25: CPT | Performed by: HOSPITALIST

## 2023-08-09 PROCEDURE — 700101 HCHG RX REV CODE 250: Performed by: STUDENT IN AN ORGANIZED HEALTH CARE EDUCATION/TRAINING PROGRAM

## 2023-08-09 PROCEDURE — 700111 HCHG RX REV CODE 636 W/ 250 OVERRIDE (IP): Mod: JZ | Performed by: STUDENT IN AN ORGANIZED HEALTH CARE EDUCATION/TRAINING PROGRAM

## 2023-08-09 RX ORDER — CHOLECALCIFEROL (VITAMIN D3) 125 MCG
5 CAPSULE ORAL
Status: DISCONTINUED | OUTPATIENT
Start: 2023-08-09 | End: 2023-08-10 | Stop reason: HOSPADM

## 2023-08-09 RX ORDER — IPRATROPIUM BROMIDE AND ALBUTEROL SULFATE 2.5; .5 MG/3ML; MG/3ML
3 SOLUTION RESPIRATORY (INHALATION)
Status: DISCONTINUED | OUTPATIENT
Start: 2023-08-09 | End: 2023-08-10 | Stop reason: HOSPADM

## 2023-08-09 RX ORDER — AMOXICILLIN 250 MG
2 CAPSULE ORAL 2 TIMES DAILY
Status: DISCONTINUED | OUTPATIENT
Start: 2023-08-09 | End: 2023-08-10 | Stop reason: HOSPADM

## 2023-08-09 RX ORDER — IBUPROFEN 400 MG/1
600 TABLET ORAL EVERY 6 HOURS PRN
Status: DISCONTINUED | OUTPATIENT
Start: 2023-08-09 | End: 2023-08-10 | Stop reason: HOSPADM

## 2023-08-09 RX ORDER — ACETAMINOPHEN 325 MG/1
650 TABLET ORAL EVERY 4 HOURS PRN
Status: DISCONTINUED | OUTPATIENT
Start: 2023-08-09 | End: 2023-08-10 | Stop reason: HOSPADM

## 2023-08-09 RX ORDER — METHYLPREDNISOLONE SODIUM SUCCINATE 125 MG/2ML
125 INJECTION, POWDER, LYOPHILIZED, FOR SOLUTION INTRAMUSCULAR; INTRAVENOUS ONCE
Status: COMPLETED | OUTPATIENT
Start: 2023-08-09 | End: 2023-08-09

## 2023-08-09 RX ORDER — POTASSIUM CHLORIDE 20 MEQ/1
40 TABLET, EXTENDED RELEASE ORAL
Status: COMPLETED | OUTPATIENT
Start: 2023-08-09 | End: 2023-08-09

## 2023-08-09 RX ORDER — POLYETHYLENE GLYCOL 3350 17 G/17G
1 POWDER, FOR SOLUTION ORAL
Status: DISCONTINUED | OUTPATIENT
Start: 2023-08-09 | End: 2023-08-10 | Stop reason: HOSPADM

## 2023-08-09 RX ORDER — BISACODYL 10 MG
10 SUPPOSITORY, RECTAL RECTAL
Status: DISCONTINUED | OUTPATIENT
Start: 2023-08-09 | End: 2023-08-10 | Stop reason: HOSPADM

## 2023-08-09 RX ADMIN — SENNOSIDES AND DOCUSATE SODIUM 2 TABLET: 50; 8.6 TABLET ORAL at 20:49

## 2023-08-09 RX ADMIN — METHYLPREDNISOLONE SODIUM SUCCINATE 125 MG: 125 INJECTION, POWDER, FOR SOLUTION INTRAMUSCULAR; INTRAVENOUS at 02:08

## 2023-08-09 RX ADMIN — IBUPROFEN 600 MG: 400 TABLET, FILM COATED ORAL at 20:50

## 2023-08-09 RX ADMIN — POTASSIUM CHLORIDE 40 MEQ: 1500 TABLET, EXTENDED RELEASE ORAL at 06:13

## 2023-08-09 RX ADMIN — POTASSIUM CHLORIDE 40 MEQ: 1500 TABLET, EXTENDED RELEASE ORAL at 05:36

## 2023-08-09 RX ADMIN — Medication 5 MG: at 22:31

## 2023-08-09 RX ADMIN — IPRATROPIUM BROMIDE AND ALBUTEROL SULFATE 3 ML: 2.5; .5 SOLUTION RESPIRATORY (INHALATION) at 04:42

## 2023-08-09 RX ADMIN — IPRATROPIUM BROMIDE AND ALBUTEROL SULFATE 3 ML: 2.5; .5 SOLUTION RESPIRATORY (INHALATION) at 10:28

## 2023-08-09 RX ADMIN — ACETAMINOPHEN 650 MG: 325 TABLET, FILM COATED ORAL at 18:20

## 2023-08-09 RX ADMIN — IPRATROPIUM BROMIDE AND ALBUTEROL SULFATE 3 ML: 2.5; .5 SOLUTION RESPIRATORY (INHALATION) at 00:59

## 2023-08-09 RX ADMIN — IPRATROPIUM BROMIDE AND ALBUTEROL SULFATE 3 ML: 2.5; .5 SOLUTION RESPIRATORY (INHALATION) at 06:47

## 2023-08-09 RX ADMIN — IOHEXOL 100 ML: 350 INJECTION, SOLUTION INTRAVENOUS at 01:42

## 2023-08-09 RX ADMIN — RIVAROXABAN 10 MG: 10 TABLET, FILM COATED ORAL at 18:15

## 2023-08-09 RX ADMIN — OXYCODONE HYDROCHLORIDE 5 MG: 5 TABLET ORAL at 13:14

## 2023-08-09 ASSESSMENT — PAIN DESCRIPTION - PAIN TYPE
TYPE: ACUTE PAIN

## 2023-08-09 ASSESSMENT — ENCOUNTER SYMPTOMS
DOUBLE VISION: 0
FEVER: 0
BLURRED VISION: 0
HEADACHES: 0
SHORTNESS OF BREATH: 0
LOSS OF CONSCIOUSNESS: 0
ABDOMINAL PAIN: 0
NAUSEA: 0
BACK PAIN: 0
CHILLS: 0
COUGH: 0
DIARRHEA: 0
VOMITING: 0
PALPITATIONS: 0
DIZZINESS: 0

## 2023-08-09 ASSESSMENT — COPD QUESTIONNAIRES
DURING THE PAST 4 WEEKS HOW MUCH DID YOU FEEL SHORT OF BREATH: NONE/LITTLE OF THE TIME
HAVE YOU SMOKED AT LEAST 100 CIGARETTES IN YOUR ENTIRE LIFE: YES
COPD SCREENING SCORE: 4
DO YOU EVER COUGH UP ANY MUCUS OR PHLEGM?: NO/ONLY WITH OCCASIONAL COLDS OR INFECTIONS

## 2023-08-09 NOTE — DISCHARGE PLANNING
Case Management Discharge Planning    Admission Date: 8/7/2023  GMLOS: 3.6  ALOS: 2    6-Clicks ADL Score: 21  6-Clicks Mobility Score: 20      Anticipated Discharge Dispo: Discharge Disposition: D/T to psych hosp or distinct part unit (65)    DME Needed: Per [facility]: In network psych facilities are as follows:     [facility]  [facility]     Any questions can be directed to [phone]

## 2023-08-09 NOTE — PROGRESS NOTES
Paged by RN the patient began to have increased work of breathing.  Patient was on 2 L nasal cannula and now requiring 15 L of oxygen via nonrebreather.  I saw patient at bedside, she appeared to be in obvious distress, sitting and leaning forward, in respiratory distress and in pain from her abdomen.  Patient noted to be extremely uncomfortable and clutching her abdomen.  She was administered 1 dose of Dilaudid to which she reports immediate symptomatic improvement.  Auscultation of lungs reveal crackles and expiratory wheezing.  Abdomen was soft, however, somewhat difficult to assess as patient was leaning forward.  No lower extremity swelling noted.  Chest x-ray done at bedside shows interstitial pulm edema, which was not present from her chest x-ray yesterday.      Assessment plan:  Acute hypoxemic respiratory failure  Pulmonary edema  COPD exacerbation  Abdominal pain    Patient currently saturating 89% on 15 L nonrebreather.  I will switch her to high flow nasal cannula as she may possibly desaturate further from Dilaudid.  I started patient on DuoNebs, Solu-Medrol, IV Lasix.  CBC, CMP, lactate, lipase, EKG and CAT scan abdomen ordered.     Patient is critically ill.   The patient continues to have: Acute hypoxemic respiratory failure  The vital organ system that is affected is the: Lungs  If untreated there is a high chance of deterioration into: Worsening hypoxemic respiratory failure  And eventually death.   The critical care that I am providing today is: HFNC, Lasix, DuoNebs, Solu-Medrol  The critical that has been undertaken is medically complex.   There has been no overlap in critical care time.   Critical Care Time not including procedures: 32

## 2023-08-09 NOTE — PROGRESS NOTES
"Hospital Medicine Daily Progress Note    Date of Service  8/9/2023    Chief Complaint  Estefany Cooper is a 68 y.o. female admitted 8/7/2023 with intentional OD    Hospital Course  67yo PMHx COPD, HTN, HLD who presented after intentional OD of doxepin, sertraline, lumateperone, propranolol, caplyta and carbamezapine    Interval Problem Update  Pt c/o some pain on the right lower rib cage \"I must have done something to it\".      I have discussed this patient's plan of care and discharge plan at IDT rounds today with Case Management, Nursing, Nursing leadership, and other members of the IDT team.    Consultants/Specialty  psychiatry    Code Status  Full Code    Disposition  The patient is medically cleared for discharge to home or a post-acute facility.  Anticipate discharge to: a psychiatric hospital    I have placed the appropriate orders for post-discharge needs.    Review of Systems  Review of Systems   Constitutional:  Negative for chills and fever.   Eyes:  Negative for blurred vision and double vision.   Respiratory:  Negative for cough and shortness of breath.    Cardiovascular:  Positive for chest pain. Negative for palpitations and leg swelling.   Gastrointestinal:  Negative for abdominal pain, diarrhea, nausea and vomiting.   Genitourinary:  Negative for dysuria and urgency.   Musculoskeletal:  Negative for back pain.   Skin:  Negative for rash.   Neurological:  Negative for dizziness, loss of consciousness and headaches.        Physical Exam  Temp:  [36.7 °C (98 °F)] 36.7 °C (98 °F)  Pulse:  [55-78] 66  Resp:  [15-33] 19  BP: ()/() 99/65  SpO2:  [86 %-96 %] 94 %    Physical Exam  Constitutional:       General: She is not in acute distress.     Appearance: Normal appearance. She is well-developed. She is not diaphoretic.   HENT:      Head: Normocephalic and atraumatic.   Neck:      Vascular: No JVD.   Cardiovascular:      Rate and Rhythm: Normal rate and regular rhythm.      Heart sounds: " No murmur heard.  Pulmonary:      Effort: Pulmonary effort is normal. No respiratory distress.      Breath sounds: No stridor. Rhonchi present. No wheezing or rales.   Abdominal:      Palpations: Abdomen is soft.      Tenderness: There is no abdominal tenderness. There is no guarding or rebound.   Musculoskeletal:         General: No tenderness.      Right lower leg: No edema.      Left lower leg: No edema.   Skin:     General: Skin is warm and dry.      Capillary Refill: Capillary refill takes less than 2 seconds.      Coloration: Skin is not jaundiced or pale.      Findings: No rash.   Neurological:      Mental Status: She is oriented to person, place, and time.   Psychiatric:         Mood and Affect: Mood normal.         Behavior: Behavior normal.         Thought Content: Thought content normal.         Fluids    Intake/Output Summary (Last 24 hours) at 8/9/2023 0735  Last data filed at 8/8/2023 1908  Gross per 24 hour   Intake 1173.53 ml   Output 525 ml   Net 648.53 ml         Laboratory  Recent Labs     08/08/23  0510 08/08/23  2345 08/09/23  0215   WBC 12.1* 17.2* 12.4*   RBC 4.26 4.40 3.98*   HEMOGLOBIN 12.8 13.1 12.3   HEMATOCRIT 40.4 41.1 36.7*   MCV 94.8 93.4 92.2   MCH 30.0 29.8 30.9   MCHC 31.7* 31.9* 33.5   RDW 50.2* 49.6 48.8   PLATELETCT 178 195 172   MPV 10.1 10.4 10.1       Recent Labs     08/08/23  1139 08/08/23  2345 08/09/23  0215   SODIUM 144 141 141   POTASSIUM 3.8 3.9 3.3*   CHLORIDE 118* 114* 109   CO2 18* 17* 19*   GLUCOSE 114* 142* 116*   BUN 23* 20 18   CREATININE 0.84 0.86 0.78   CALCIUM 8.3* 8.8 8.5       Recent Labs     08/07/23  1901   APTT 20.5*   INR 1.08                 Imaging  CT-ABDOMEN-PELVIS WITH   Final Result      1.  Trace bilateral pleural effusions, left greater than right with associated compressive atelectasis.   2.  Mild interstitial pulmonary edema.   3.  Mild intra and extra hepatic biliary ductal dilation, likely physiologic status post cholecystectomy.   4.   Atherosclerosis including coronary artery disease.      DX-CHEST-LIMITED (1 VIEW)   Final Result      1.  Moderate interstitial pulmonary edema.   2.  Stable enlargement of the cardiomediastinal silhouette.      CT-CTA NECK WITH & W/O-POST PROCESSING   Final Result      CT angiogram of the neck within normal limits.      CT-CTA HEAD WITH & W/O-POST PROCESS   Final Result      1.  No evidence of intracranial artery large vessel occlusion.   2.  Possible small 3 mm aneurysm or infundibulum arising from the distal right internal carotid artery. Attention on follow-up imaging is recommended.      DX-CHEST-PORTABLE (1 VIEW)   Final Result      Mild cardiomegaly             Assessment/Plan  * Intentional drug overdose, initial encounter (HCC)- (present on admission)  Assessment & Plan  Reports passing of  recently left suicide note and intentionally consumed doxepin, sertraline, lumateperone, propranolol, caplyta and carbamazepine.  Poison control has been contacted by ER team. Case #[id number]  pt's mental status is now back to normal  Tele benign  cont's to have acidosis which is improving on repeat lab    Metabolic acidosis  Assessment & Plan  Hyperchloremic acidosis  DC NS  Repeat BMP shows CO2 coming back up  Cont to repeat BMP until normalized  Given it's correcting do not think an ABG is warranted    Hypotension  Assessment & Plan  Likely from overdose with home prescribed medications   Improving with IV Fluids     Abnormal CT scan of head  Assessment & Plan  Possible small 3 mm aneurysm or infundibulum arising from the distal right internal carotid artery.   D/w Neuro IR today: they would like to see in follow up.  Referal placed, pt informed and aware    Toxic metabolic encephalopathy  Assessment & Plan  Likely From ingestion of multiple medications  pts mental status is now back to her baseline     Suicide attempt (HCC)  Assessment & Plan  Reports passing of  recently left suicide note and  intentionally consumed doxepin, sertraline, lumateperone, propranolol, caplyta and carbamazepine.  Poison control has been contacted by ER team. Case #[id number]  Aggressive IV Fluids   1:1 constant observation   Psychiatry team consulted    Chronic hypoxemic respiratory failure (HCC)- (present on admission)  Assessment & Plan  O2/RT protocols  Good airmovement: dc systemic steroids and scheduled NPPBs      HTN (hypertension)- (present on admission)  Assessment & Plan  On propranolol as outpt  hled in setting of hypotension  Cont to [id number]    Psychiatric disorder- (present on admission)  Assessment & Plan  Holding home meds pending Psych eval and their recommendations         VTE prophylaxis:     I have performed a physical exam and reviewed and updated ROS and Plan today (8/9/2023). In review of yesterday's note (8/8/2023), there are no changes except as documented above.           Xarelto 10mg daily as prophylaxis

## 2023-08-09 NOTE — RESPIRATORY CARE
COPD EDUCATION by COPD CLINICAL EDUCATOR  8/9/2023 at 12:10 PM by Luzma Holland RRT     Patient seen by Dr. Hensley last night after having increased work of breathing, started treatment for possible COPD exacerbation. Received clarification from attending MD that patient is NOT in COPD exacerbation, changed scheduled nebulizer treatments to PRN and MD states he will discontinue steroids. Communicated this to attending Respiratory Therapist.   Patient was seen by [facility] in 2020, had normal PFT at that time, and quit smoking in 2020. Patient does not take respiratory medications and states she does not typically have issues with her breathing. Therefore the patient does not qualify for the COPD program.     PFT Results    12/4/2020: FEV1 2.04 L(94%), ratio 76%

## 2023-08-09 NOTE — CONSULTS
Behavioral [facility]  PSYCHIATRIC CONSULTATION - Follow-up  Established Patient    DOS: 08/09/23     Reason for Admission: 68 y.o. female with pmhx of COPD, HTN, HLD who presented 8/7/2023 with aftter suicidal attempt with medications  Legal Hold Status: on legal hold    CC:   Chief Complaint   Patient presents with    Suicide Attempt     Pt left a suicide note for friend to find and intentionally overdosed on unknown amount/type of substances    ALOC     Pt GCS 11, speaking incomprehensibly.                S:   Patient observed in bed, awake. Reports improved sleep, energy, mood. Denies SI/HI, no Sx of psychosis/jamar reported or observed. Increased energy, hyper verbal, almost pressured speech. Future focused, expressing a desire to d/c home to care for animals and secure housing, and seek stability. Reports anxiety r/t lack of resources, able to vocalize plan to maintain safety including calling OP provider to evaluate medications. Denies GI distress, HA, dizziness, appears to be tolerating medications.    O:   Medical ROS (as pertinent):   Recent Labs     08/08/23  0510 08/08/23 2345 08/09/23  0215   WBC 12.1* 17.2* 12.4*   RBC 4.26 4.40 3.98*   HEMOGLOBIN 12.8 13.1 12.3   HEMATOCRIT 40.4 41.1 36.7*   MCV 94.8 93.4 92.2   MCH 30.0 29.8 30.9   RDW 50.2* 49.6 48.8   PLATELETCT 178 195 172   MPV 10.1 10.4 10.1   NEUTSPOLYS 76.70* 75.10* 84.20*   LYMPHOCYTES 16.10* 17.50* 8.90*   MONOCYTES 6.20 5.40 5.90   EOSINOPHILS 0.10 0.90 0.20   BASOPHILS 0.40 0.50 0.20     Recent Labs     08/08/23  1139 08/08/23  2345 08/09/23  0215   SODIUM 144 141 141   POTASSIUM 3.8 3.9 3.3*   CHLORIDE 118* 114* 109   CO2 18* 17* 19*   GLUCOSE 114* 142* 116*   BUN 23* 20 18     Recent Labs     08/08/23  1139 08/08/23 2345 08/09/23  0215   ALBUMIN 3.4 3.8 3.7   TBILIRUBIN 0.2 0.3 0.2   ALKPHOSPHAT 71 84 72   TOTPROTEIN 5.4* 6.4 5.7*   ALTSGPT 17 19 16   ASTSGOT 17 24 19   CREATININE 0.84 0.86 0.78       EKG:   Results for orders  placed or performed during the hospital encounter of 23   EKG   Result Value Ref Range    Report       [facility] Emergency Dept.    Test Date:  2023  Pt Name:    BA PELAEZ              Department: ER  MRN:        [id number]                      Room:        22  Gender:     Female                       Technician: [id number]  :        1955                   Requested By:ER TRIAGE PROTOCOL  Order #:    [id number]                    Reading MD:    Measurements  Intervals                                Axis  Rate:       47                           P:          26  LA:         197                          QRS:        24  QRSD:       99                           T:          56  QT:         441  QTc:        390    Interpretive Statements  Sinus bradycardia  RSR' in V1 or V2, probably normal variant  Compared to ECG 2012 11:02:17  RSR' in V1 or V2 now present  Sinus rhythm no longer present  T-wave abnormality no longer present     EKG   Result Value Ref Range    Report       [facility]    Test Date:  2023  Pt Name:    BA PELAEZ              Department: IMCU  MRN:        [id number]                      Room:       [facility]  Gender:     Female                       Technician: DGG  :        1955                   Requested By:RAJ VALIENTE  Order #:    [id number]                    Reading MD: Ranjan Jarvis MD    Measurements  Intervals                                Axis  Rate:       52                           P:          43  LA:         197                          QRS:        51  QRSD:       82                           T:          63  QT:         465  QTc:        433    Interpretive Statements  Sinus bradycardia  Borderline low voltage, extremity leads  Compared to ECG 2023 18:57:42  No significant changes  Electronically Signed On 2023 06:59:33 PDT by Ranjan Jarvis MD     EKG   Result Value Ref Range    Report       [facility]    Test Date:  2023  Pt Name:    BA PELAEZ              Department: [facility]  MRN:        [id number]                      [place]       [facility]  Gender:     Female                       Technician: [facility]  :        1955                   Requested By:RAJ VALIENTE  Order #:    [id number]                    Reading MD: Rasta Boyer MD    Measurements  Intervals                                Axis  Rate:       55                           P:          27  AL:         197                          QRS:        42  QRSD:       89                           T:          64  QT:         428  QTc:        410    Interpretive Statements  SINUS BRADYCARDIA  BORDERLINE LOW VOLTAGE, EXTREMITY LEADS  Electronically Signed On 2023 17:14:03 PDT by Rasta Boyer MD     EKG   Result Value Ref Range    Report       [facility]    Test Date:  2023  [facility]              Department[facility]  MRN:        [id number]                      [place]       [facility]  Gender:     Female                       Technician: DGG  :        1955                   Requested By:RAJ VALIENTE  Order #:    [id number]                    Reading MD: Harjit Serra MD    Measurements  Intervals                                Axis  Rate:       62                           P:          45  AL:         174                          QRS:        42  QRSD:       88                           T:          74  QT:         420  QTc:        427    Interpretive Statements  Sinus rhythm  Borderline low voltage, extremity leads  Compared to ECG 2023 16:10:08  Sinus bradycardia no longer present  Electronically Signed On 2023 11:50:03 PDT by Harjit Serra MD     EKG   Result Value Ref Range    Report       [facility]    Test Date:  2023  Pt Name:    BA CHIANGGLEN              Department: [facility]  MRN:        [id number]                      [place]       [facility]  Gender:     Female                        "Technician: ROCAEL  :        1955                   Requested By:MAYE SULY AHUMADA  Order #:    [id number]                    Reading MD: Harjit Serra MD    Measurements  Intervals                                Axis  Rate:       76                           P:          0  OH:         47                           QRS:        31  QRSD:       86                           T:          256  QT:         373  QTc:        420    Interpretive Statements  Sinus rhythm  Short OH interval  Probable left atrial enlargement  Nonspecific repol abnormality, diffuse leads  Electronically Signed On 2023 11:52:47 PDT by Harjit Serra MD          MSE:   /58   Pulse 65   Temp 36.7 °C (98 °F) (Temporal)   Resp (!) 22   Ht 1.549 m (5' 0.98\")   Wt 69 kg (152 lb 1.9 oz)   SpO2 93%     Constitutional: as noted above  General Appearance/Behavior: 68 y.o. appears obese intermittent eye contact cooperative, No behavioral disturbances  Abnormal Movements: none, no PMA/PMR or tremor observed.  Gait and Posture: not observed  Musculoskeletal: as noted above  Mood: \"good\"  Affect: Mood/Congruent and Appropriate   Speech: normal rate, normal rhythm, normal tone, normal volume, and normal fluency  Language:  spontaneous, comprehends spoken commands, and fluent   Thought Process: Linear and Goal Directed, Future Oriented  Thought Content: Denies SI/HI, A/VH. No e/o delusions, or internal preoccupation  Insight/Judgement:  poor to fair  Alert/Orientation: alert, oriented to person, place and time  Attn/Concentration: short attention span  MMSE: deferred this visit     Medications:  Scheduled Medications   Medication Dose Frequency    rivaroxaban  10 mg DAILY AT 1800       Allergies:   Allergies   Allergen Reactions    Percodan [Oxycodone-Aspirin] Nausea    Vicodin [Hydrocodone-Acetaminophen] Nausea        Assessment:   Diagnosis:   1. Intentional drug overdose, initial encounter ([facility])    2. Dehydration    3. Suicide attempt " ([facility])    4. Disorientation    5. Aneurysm, carotid artery, internal         Psychiatric:   Bipolar I disorder     Medical: as noted by the medical team.   COPD  HTN  Hep C     Recommendations:  Legal Status: on legal hold     Please transfer patient to inpatient psychiatric hospital when medically cleared and bed is available.     Observation status:   - Line of site with sitter     Phone: Yes - Okay to use at nursing availability/discretion  Visitors: Yes, brother  Personal belongings: Yes, cell phone okay to use an nursing discretion     Discussed/voalted: CHERYLE Bowser RN, DO     Medication Recommendations: Final orders as per Treatment Team  Restart carbamazepine 200mg PO BID; sertraline 100mg PO daily (start 8/9)  Risks/benefits/side effects discussed, patient verbalized understanding.  Medication reconciliation was completed.  Maintain legal hold for safety     Reviewed safety plan: 911, ER, PCM, MHC, suicide crisis line, nursing staff while inpatient.     Will Continue to Follow. Thank you for the consult.

## 2023-08-09 NOTE — CARE PLAN
The patient is Watcher - Medium risk of patient condition declining or worsening    Shift Goals  Clinical Goals: Safety, hemodynamic stability  Patient Goals: Go home  Family Goals: MANA    Progress made toward(s) clinical / shift goals:    Problem: Knowledge Deficit - Standard  Goal: Patient and family/care givers will demonstrate understanding of plan of care, disease process/condition, diagnostic tests and medications  Outcome: Progressing     Problem: Provide Safe Environment  Goal: Suicide environmental safety, protocols, policies, and practices will be implemented  Outcome: Progressing     Problem: Psychosocial  Goal: Patient's ability to identify and develop effective coping behaviors will improve  Outcome: Progressing     Problem: Depression  Goal: Patient and family/caregiver will verbalize accurate information about at least two of the possible causes of depression, three-four of the signs and symptoms of depression  Outcome: Progressing     Problem: Fall Risk  Goal: Patient will remain free from falls  Outcome: Progressing     Problem: Knowledge Deficit - COPD  Goal: Patient/significant other demonstrates understanding of disease process, utilization of the Action Plan, medications and discharge instruction  Outcome: Progressing     Problem: Impaired Gas Exchange  Goal: Patient will demonstrate improved ventilation and adequate oxygenation and participate in treatment regimen within the level of ability/situation.  Outcome: Progressing     Problem: Self Care  Goal: Patient will have the ability to perform ADLs independently or with assistance (bathe, groom, dress, toilet and feed)  Outcome: Progressing       Patient is not progressing towards the following goals:

## 2023-08-09 NOTE — PROGRESS NOTES
Chart Check Complete    Monitor Summary:  Rhythm and Rate: SR 58-60s bpm  Ectopy: PVCs (r)

## 2023-08-10 VITALS
TEMPERATURE: 98 F | OXYGEN SATURATION: 94 % | HEIGHT: 61 IN | SYSTOLIC BLOOD PRESSURE: 152 MMHG | HEART RATE: 62 BPM | RESPIRATION RATE: 18 BRPM | BODY MASS INDEX: 28.72 KG/M2 | WEIGHT: 152.12 LBS | DIASTOLIC BLOOD PRESSURE: 86 MMHG

## 2023-08-10 PROBLEM — E87.20 METABOLIC ACIDOSIS: Status: RESOLVED | Noted: 2023-08-08 | Resolved: 2023-08-10

## 2023-08-10 PROBLEM — G92.8 TOXIC METABOLIC ENCEPHALOPATHY: Status: RESOLVED | Noted: 2023-08-08 | Resolved: 2023-08-10

## 2023-08-10 PROBLEM — T14.91XA SUICIDE ATTEMPT (HCC): Status: RESOLVED | Noted: 2023-08-08 | Resolved: 2023-08-10

## 2023-08-10 PROBLEM — I95.9 HYPOTENSION: Status: RESOLVED | Noted: 2023-08-08 | Resolved: 2023-08-10

## 2023-08-10 PROBLEM — T50.902A INTENTIONAL DRUG OVERDOSE, INITIAL ENCOUNTER (HCC): Status: RESOLVED | Noted: 2023-08-07 | Resolved: 2023-08-10

## 2023-08-10 PROCEDURE — A9270 NON-COVERED ITEM OR SERVICE: HCPCS | Performed by: HOSPITALIST

## 2023-08-10 PROCEDURE — A9270 NON-COVERED ITEM OR SERVICE: HCPCS | Performed by: STUDENT IN AN ORGANIZED HEALTH CARE EDUCATION/TRAINING PROGRAM

## 2023-08-10 PROCEDURE — 700102 HCHG RX REV CODE 250 W/ 637 OVERRIDE(OP): Performed by: STUDENT IN AN ORGANIZED HEALTH CARE EDUCATION/TRAINING PROGRAM

## 2023-08-10 PROCEDURE — 700102 HCHG RX REV CODE 250 W/ 637 OVERRIDE(OP): Performed by: HOSPITALIST

## 2023-08-10 PROCEDURE — 99239 HOSP IP/OBS DSCHRG MGMT >30: CPT | Performed by: HOSPITALIST

## 2023-08-10 RX ORDER — CARBAMAZEPINE 200 MG/1
200 TABLET ORAL 2 TIMES DAILY
Status: DISCONTINUED | OUTPATIENT
Start: 2023-08-10 | End: 2023-08-10 | Stop reason: HOSPADM

## 2023-08-10 RX ADMIN — MAGNESIUM HYDROXIDE 30 ML: 1200 LIQUID ORAL at 10:41

## 2023-08-10 RX ADMIN — IBUPROFEN 600 MG: 400 TABLET, FILM COATED ORAL at 05:15

## 2023-08-10 RX ADMIN — IBUPROFEN 600 MG: 400 TABLET, FILM COATED ORAL at 13:41

## 2023-08-10 RX ADMIN — SERTRALINE 100 MG: 50 TABLET, FILM COATED ORAL at 08:54

## 2023-08-10 RX ADMIN — SENNOSIDES AND DOCUSATE SODIUM 2 TABLET: 50; 8.6 TABLET ORAL at 05:07

## 2023-08-10 RX ADMIN — OXYCODONE HYDROCHLORIDE 5 MG: 5 TABLET ORAL at 08:54

## 2023-08-10 ASSESSMENT — PAIN DESCRIPTION - PAIN TYPE
TYPE: ACUTE PAIN

## 2023-08-10 NOTE — CARE PLAN
The patient is Stable - Low risk of patient condition declining or worsening    Shift Goals  Clinical Goals: Pain control, hemodynamic stability  Patient Goals: Pain control  Family Goals: MANA    Progress made toward(s) clinical / shift goals:    Problem: Knowledge Deficit - Standard  Goal: Patient and family/care givers will demonstrate understanding of plan of care, disease process/condition, diagnostic tests and medications  Outcome: Progressing     Problem: Psychosocial  Goal: Patient's ability to identify and develop effective coping behaviors will improve  Outcome: Progressing     Problem: Depression  Goal: Patient and family/caregiver will verbalize accurate information about at least two of the possible causes of depression, three-four of the signs and symptoms of depression  Outcome: Progressing     Problem: Fall Risk  Goal: Patient will remain free from falls  Outcome: Progressing     Problem: Pain - Standard  Goal: Alleviation of pain or a reduction in pain to the patient’s comfort goal  Outcome: Progressing       Patient is not progressing towards the following goals:

## 2023-08-10 NOTE — DISCHARGE PLANNING
[facility] ALERT TEAM DISCHARGE PLANNING NOTE    Date:  8/10/23  Patient Name:  Estefany Cooper - 68 y.o. - Discharge Planning  MRN:  [id number]   YOB: 1955  ADMISSION DATE:  8/7/2023     Writer forwarded referral packet for inpatient psychiatric care to the following community providers:  [facility], [facility], [facility], [facility]    Items included in the referral packet:   __x___Face Sheet   __x___Pages 1 and 2 of completed legal hold   __x___Alert Team/Psych Assessment   __x___H&P   __x___UDS   __x___Blood Alcohol   __x___Vital signs   __n/a___Pregnancy Test (if applicable)   __x___Medications List   _____Covid Screen

## 2023-08-10 NOTE — DISCHARGE PLANNING
Alert Team Note:    Contacted [facility], spoke to Vicky. Pt has been accepted, accepting is Dr. Tejada. Facility expects transport at 1600.  Informed WALTER Santos.

## 2023-08-10 NOTE — DISCHARGE PLANNING
Legal Hold Transfer     Referral: Legal hold transfer to Mental Health Facility     Intervention: Notified by  Eron that pt has been accepted to [facility].     Pt's accepting physcian is Dr. Tejada     Transport arranged through [facility]     The pt will be picked up at 1645      Notified Bedside YECENIA Webb, RN BAY Resendez, and Dr. Watts of the departure time as well as accepting facility.      Transfer packet and COBRA created with original legal hold and placed on chart by RN CM.     Plan: Pt will be transferring to [facility] today at 1645 via [facility].

## 2023-08-10 NOTE — DISCHARGE SUMMARY
Discharge Summary    CHIEF COMPLAINT ON ADMISSION  Chief Complaint   Patient presents with    Suicide Attempt     Pt left a suicide note for friend to find and intentionally overdosed on unknown amount/type of substances    ALOC     Pt GCS 11, speaking incomprehensibly.        Reason for Admission  ems     Admission Date  8/7/2023    CODE STATUS  Full Code    HPI & HOSPITAL COURSE  This is a 68 y.o. female here with history of COPD, hypertension, dyslipidemia, chronic hypoxic respiratory failure, hypothyroidism.    Patient presented for evaluation on August 7 to our facility.  She had apparently attempted suicide, and left a note to that effect.  She was found with some empty bottles including doxepin, sertraline, lumateperone, propranolol, Caplyta, and carbamazepine.  It is unknown how much of what she took.  EMS found the patient to be altered with slurred speech and bradycardia down to the 30s.  Case was reviewed with the [facility], and patient was admitted to the IMCU    In the IMCU, the patient was treated supportively.  Her carbamazepine level came back elevated.  She was hypotensive, however responded to fluids and did not need vasopressors.  Clinically she did well, with return to normal mental status the day following admission and normalization of her vital signs.  Labs including her acidosis improved.    Incidentally noted during her workup was a small 3 mm ICA aneurysm at the level of the infundibulum.  I discussed this with interventional radiology, recommended outpatient follow-up with them.  A referral has been placed and they will be calling the patient to establish follow-up visit.    Patient was also seen by psychiatry team during her admission.  They felt that the most appropriate course of action was discharged to an inpatient psychiatric bed which is happening.  No notes on file    Therefore, she is discharged in good and stable condition to a psychiatric hospital.    The patient met 2-midnight  criteria for an inpatient stay at the time of discharge.    Discharge Date  8/10/2023    FOLLOW UP ITEMS POST DISCHARGE  With interventional radiology, PCP, and patient is being discharged to an inpatient psychiatric facility.    DISCHARGE DIAGNOSES  Principal Problem (Resolved):    Intentional drug overdose, initial encounter (HCC) (POA: Yes)  Active Problems:    Psychiatric disorder (POA: Yes)      Overview: Pt is currently at mental health on klonipin    HTN (hypertension) (POA: Yes)    Chronic hypoxemic respiratory failure (HCC) (POA: Yes)    Abnormal CT scan of head (POA: Unknown)  Resolved Problems:    Thyroid disease (POA: Yes)    Suicide attempt (HCC) (POA: Unknown)    Toxic metabolic encephalopathy (POA: Unknown)    Hypotension (POA: Unknown)    Metabolic acidosis (POA: Unknown)      FOLLOW UP  No future appointments.  No follow-up provider specified.    MEDICATIONS ON DISCHARGE     Medication List        START taking these medications        Instructions   umeclidinium-vilanterol 62.5-25 MCG/ACT Aepb inhaler  Start taking on: August 11, 2023  Commonly known as: Anoro Ellipta   Inhale 1 Puff every day.  Dose: 1 Puff            CONTINUE taking these medications        Instructions   Caplyta 42 MG Caps  Generic drug: Lumateperone Tosylate   Take 42 mg by mouth every day.  Dose: 42 mg     carBAMazepine 200 MG Tabs  Commonly known as: TEGretol   Take 200 mg by mouth 2 times a day.  Dose: 200 mg     Crestor 10 MG Tabs  Generic drug: rosuvastatin   Take 10 mg by mouth every evening.  Dose: 10 mg     doxepin 100 MG Caps  Commonly known as: SINEquan   Take 100 mg by mouth every evening.  Dose: 100 mg     propranolol 60 MG tablet  Commonly known as: Inderal   Take 60 mg by mouth every day.  Dose: 60 mg     sertraline 100 MG Tabs  Commonly known as: Zoloft   Take 100 mg by mouth every day.  Dose: 100 mg              Allergies  Allergies   Allergen Reactions    Percodan [Oxycodone-Aspirin] Nausea    Vicodin  [Hydrocodone-Acetaminophen] Nausea       DIET  Orders Placed This Encounter   Procedures    Diet Order Diet: Regular; Tray Modifications (optional): No Sharps (Paperware)     Paperware only on meal tray.     Standing Status:   Standing     Number of Occurrences:   1     Order Specific Question:   Diet:     Answer:   Regular [1]     Order Specific Question:   Tray Modifications (optional)     Answer:   No Sharps (Paperware)       ACTIVITY  As tolerated.  Weight bearing as tolerated    CONSULTATIONS  Psychiatry    PROCEDURES      LABORATORY  Lab Results   Component Value Date    SODIUM 141 08/09/2023    POTASSIUM 3.3 (L) 08/09/2023    CHLORIDE 109 08/09/2023    CO2 19 (L) 08/09/2023    GLUCOSE 116 (H) 08/09/2023    BUN 18 08/09/2023    CREATININE 0.78 08/09/2023    CREATININE 0.8 05/05/2009        Lab Results   Component Value Date    WBC 12.4 (H) 08/09/2023    HEMOGLOBIN 12.3 08/09/2023    HEMATOCRIT 36.7 (L) 08/09/2023    PLATELETCT 172 08/09/2023        Total time of the discharge process exceeds 35 minutes.  The majority of that time spent with the patient on the day of discharge reviewing discharge planning and instructions

## 2023-08-10 NOTE — CARE PLAN
The patient is Stable - Low risk of patient condition declining or worsening    Shift Goals  Clinical Goals: Pain control, hemodynamic stability  Patient Goals: Pain control  Family Goals: MANA    Progress made toward(s) clinical / shift goals:    Problem: Pain - Standard  Goal: Alleviation of pain or a reduction in pain to the patient’s comfort goal  8/9/2023 1921 by Kavin Fernandez REmersonNEmerson  Outcome: Progressing  8/9/2023 1920 by Kavin Fernandez REmersonNEmerson  Outcome: Progressing       Patient is not progressing towards the following goals:

## 2023-08-10 NOTE — DISCHARGE PLANNING
"Alert Team Note:    Contacted by [facility], spoke to Vicky. Per Vicky, she is having trouble getting the prior auth from [facility] for this pt, and she inquired if Writer had spoken to them. Writer informed her that she has not spoke to [facility] regarding this pt.   Vicky informed writer that [facility] will still be accepting this pt tonight, and that she would \"work on\" the prior auth.     Writer was then contacted by YECENIA Kang, who indicated that [facility] had contacted the charge RN stating this pt did not yet have prior authorization. Writer informed Pearl that per Vicky with [facility], they are still accepting this pt tonight. Writer also provided Pearl with Vicky's phone number to forward to [facility] in case they call again.   "

## 2023-08-10 NOTE — CONSULTS
Behavioral [facility]  PSYCHIATRIC CONSULTATION - Follow-up  Established Patient    DOS: 08/10/23     Reason for Admission: 68 y.o. female with pmhx of COPD, HTN, HLD who presented 8/7/2023 with aftter suicidal attempt with medications  Legal Hold Status: on legal hold    CC:   Chief Complaint   Patient presents with    Suicide Attempt     Pt left a suicide note for friend to find and intentionally overdosed on unknown amount/type of substances    ALOC     Pt GCS 11, speaking incomprehensibly.                S:   Patient observed in bed, awake. Encounter completed with brother on phone per request. Reports improved sleep, energy, appetite, mood. Hyper verbal, reports feeling increased energy, denies SI/HI, no Sx of psychosis reported or observed. Future focused on care, agreeable to transition to IP psychiatric facility if necessary, education provided r/t restarting medications for depression, jamar. Tearful presentation, able to vocalize reasons for living including her brother, her dog. Reports reduced anxiety follow speaking with brother, hearing about her dog, and understanding the Tx plan. Denies GI distress, HA, dizziness, appears to be tolerating medications.    O:   Medical ROS (as pertinent):   Recent Labs     08/08/23  0510 08/08/23 2345 08/09/23  0215   WBC 12.1* 17.2* 12.4*   RBC 4.26 4.40 3.98*   HEMOGLOBIN 12.8 13.1 12.3   HEMATOCRIT 40.4 41.1 36.7*   MCV 94.8 93.4 92.2   MCH 30.0 29.8 30.9   RDW 50.2* 49.6 48.8   PLATELETCT 178 195 172   MPV 10.1 10.4 10.1   NEUTSPOLYS 76.70* 75.10* 84.20*   LYMPHOCYTES 16.10* 17.50* 8.90*   MONOCYTES 6.20 5.40 5.90   EOSINOPHILS 0.10 0.90 0.20   BASOPHILS 0.40 0.50 0.20     Recent Labs     08/08/23  1139 08/08/23 2345 08/09/23  0215   SODIUM 144 141 141   POTASSIUM 3.8 3.9 3.3*   CHLORIDE 118* 114* 109   CO2 18* 17* 19*   GLUCOSE 114* 142* 116*   BUN 23* 20 18     Recent Labs     08/08/23  1139 08/08/23 2345 08/09/23  0215   ALBUMIN 3.4 3.8 3.7   TBILIRUBIN  0.2 0.3 0.2   ALKPHOSPHAT 71 84 72   TOTPROTEIN 5.4* 6.4 5.7*   ALTSGPT 17 19 16   ASTSGOT 17 24 19   CREATININE 0.84 0.86 0.78       EKG:   Results for orders placed or performed during the hospital encounter of 23   EKG   Result Value Ref Range    Report       [facility] Emergency Dept.    Test Date:  2023  Pt Name:    BA PELAEZ              Department: ER  MRN:        [id number]                      Room:        22  Gender:     Female                       Technician: [id number]  :        1955                   Requested By:ER TRIAGE PROTOCOL  Order #:    [id number]                    Reading MD:    Measurements  Intervals                                Axis  Rate:       47                           P:          26  NC:         197                          QRS:        24  QRSD:       99                           T:          56  QT:         441  QTc:        390    Interpretive Statements  Sinus bradycardia  RSR' in V1 or V2, probably normal variant  Compared to ECG 2012 11:02:17  RSR' in V1 or V2 now present  Sinus rhythm no longer present  T-wave abnormality no longer present     EKG   Result Value Ref Range    Report       [facility]    Test Date:  2023  Pt Name:    BA [facility]              Department: [facility]  MRN:        [id number]                      [place]       [facility]  Gender:     Female                       Technician: [id number]  :        1955                   Requested By:RAJ VALIENTE  Order #:    [id number]                    Reading MD: Ranjan Jarvis MD    Measurements  Intervals                                Axis  Rate:       52                           P:          43  NC:         197                          QRS:        51  QRSD:       82                           T:          63  QT:         465  QTc:        433    Interpretive Statements  Sinus bradycardia  Borderline low voltage, extremity leads  Compared to ECG 2023 18:57:42  No  significant changes  Electronically Signed On 2023 06:59:33 PDT by Ranjan Jarvis MD     EKG   Result Value Ref Range    Report       [facility]    Test Date:  2023  Pt Name:    BA PELAEZ              Department: [facility]  MRN:        [id number]                      Room:       [facility]  Gender:     Female                       Technician: [facility]  :        1955                   Requested By:RAJ VALIENTE  Order #:    [id number]                    Reading MD: Rasta Boyer MD    Measurements  Intervals                                Axis  Rate:       55                           P:          27  UT:         197                          QRS:        42  QRSD:       89                           T:          64  QT:         428  QTc:        410    Interpretive Statements  SINUS BRADYCARDIA  BORDERLINE LOW VOLTAGE, EXTREMITY LEADS  Electronically Signed On 2023 17:14:03 PDT by Rasta Boyer MD     EKG   Result Value Ref Range    Report       [facility]    Test Date:  2023  Pt Name:    BA PELAEZ              Department: [facility]  MRN:        [id number]                      Room:       [facility]  Gender:     Female                       Technician: [facility]  :        1955                   Requested By:RAJ VALIENTE  Order #:    [id number]                    Reading MD: Harjit Serra MD    Measurements  Intervals                                Axis  Rate:       62                           P:          45  UT:         174                          QRS:        42  QRSD:       88                           T:          74  QT:         420  QTc:        427    Interpretive Statements  Sinus rhythm  Borderline low voltage, extremity leads  Compared to ECG 2023 16:10:08  Sinus bradycardia no longer present  Electronically Signed On 2023 11:50:03 PDT by Harjit Serra MD     EKG   Result Value Ref Range    Report       [facility]    Test Date:  2023  Pt Name:     "BA PELAEZ              Department: [facility]  MRN:        [id number]                      Room:       [facility]  Gender:     Female                       Technician: ROCAEL  :        1955                   Requested By:MAYE AHUMADA  Order #:    [id number]                    Reading MD: Harjit Serra MD    Measurements  Intervals                                Axis  Rate:       76                           P:          0  AL:         47                           QRS:        31  QRSD:       86                           T:          256  QT:         373  QTc:        420    Interpretive Statements  Sinus rhythm  Short AL interval  Probable left atrial enlargement  Nonspecific repol abnormality, diffuse leads  Electronically Signed On 2023 11:52:47 PDT by Harjit Serra MD          MSE:   BP (!) 152/86   Pulse 62   Temp 36.7 °C (98 °F) (Temporal)   Resp 18   Ht 1.549 m (5' 0.98\")   Wt 69 kg (152 lb 1.9 oz)   SpO2 94%     Constitutional: as noted above  General Appearance/Behavior: 68 y.o. appears obese good eye contact cooperative, Poor impulse control  Abnormal Movements: none, no PMA/PMR or tremor observed.  Gait and Posture: not observed  Musculoskeletal: as noted above  Mood: \"good\"  Affect: Mood/Congruent and Appropriate   Speech: loud  Language:  spontaneous, comprehends spoken commands, and fluent   Thought Process: Linear, Logical, and Goal Directed, Future Oriented  Thought Content: Denies SI/HI, A/VH. No e/o delusions, or internal preoccupation  Insight/Judgement:  poor to fair  Alert/Orientation: alert, oriented to person, place and time  Attn/Concentration: normal  MMSE: deferred this visit     Medications:  Scheduled Medications   Medication Dose Frequency    carBAMazepine  200 mg BID    sertraline  100 mg DAILY    umeclidinium-vilanterol  1 Puff DAILY    senna-docusate  2 Tablet BID    rivaroxaban  10 mg DAILY AT 1800       Allergies:   Allergies   Allergen Reactions    Percodan " [Oxycodone-Aspirin] Nausea    Vicodin [Hydrocodone-Acetaminophen] Nausea        Assessment:   Diagnosis:   1. Intentional drug overdose, initial encounter (HCC)    2. Dehydration    3. Suicide attempt (HCC)    4. Disorientation    5. Aneurysm, carotid artery, internal         Psychiatric:   Bipolar I disorder     Medical: as noted by the medical team.   COPD  HTN  Hep C     Recommendations:  Legal Status: on legal hold     Please transfer patient to inpatient psychiatric hospital when medically cleared and bed is available.     Observation status:   - Line of site with sitter     Phone: Yes - Okay to use at nursing availability/discretion  Visitors: Yes, brother  Personal belongings: Yes, cell phone okay to use an nursing discretion     Discussed/voalted: CHERYLE Watts DO     Medication Recommendations: Final orders as per Treatment Team  No new medication recommendations: continue carbamazepine 200mg PO BID; sertraline 100mg PO daily  Risks/benefits/side effects discussed, patient verbalized understanding.  Medication reconciliation was completed.  Maintain legal hold for safety, seeking extension     Reviewed safety plan: 911, ER, PCM, MHC, suicide crisis line, nursing staff while inpatient.     Will Continue to Follow. Thank you for the consult.

## 2023-08-10 NOTE — CARE PLAN
The patient is Stable - Low risk of patient condition declining or worsening    Shift Goals  Clinical Goals: Safety, Pain management  Patient Goals: Pain Coping Mechanisms, Rest  Family Goals: MANA    Progress made toward(s) clinical / shift goals:        Problem: Knowledge Deficit - Standard  Goal: Patient and family/care givers will demonstrate understanding of plan of care, disease process/condition, diagnostic tests and medications  Outcome: Progressing     Problem: Provide Safe Environment  Goal: Suicide environmental safety, protocols, policies, and practices will be implemented  Outcome: Progressing     Problem: Pain - Standard  Goal: Alleviation of pain or a reduction in pain to the patient’s comfort goal  Outcome: Progressing     Patient has 1:1 sitter in room. She has been updated on plan of care and will continue to be updated as information arises. Safe environment and practices have been implemented, patient is aware of situation. Her pain has been alleviated by medication per the MAR and will continuously be assessed throughout the shift for further pain management needs.

## 2023-08-10 NOTE — CARE PLAN
The patient is Stable - Low risk of patient condition declining or worsening    Shift Goals  Clinical Goals: Safety  Patient Goals: pain control, go home  Family Goals: MANA    Progress made toward(s) clinical / shift goals:    Problem: Provide Safe Environment  Goal: Suicide environmental safety, protocols, policies, and practices will be implemented  Outcome: Progressing, pt has safety sitter and protocols in place.      Problem: Fall Risk  Goal: Patient will remain free from falls  Outcome: Progressing, pt remains free from falls.      Problem: Pain - Standard  Goal: Alleviation of pain or a reduction in pain to the patient’s comfort goal  Outcome: Progressing, pt has reduced pain with current pain management.        Patient is not progressing towards the following goals:

## 2024-03-03 NOTE — ED TRIAGE NOTES
Chief Complaint   Patient presents with    Suicide Attempt     Pt left a suicide note for friend to find and intentionally overdosed on unknown amount/type of substances    ALOC     Pt GCS 11, speaking incomprehensibly.      Pt BIB [facility] from home for above complaint. Empty bottles of doxepin and sertraline found as well as partial bottles of lumataperone, rosuvastatin, propranolol and carbamazepine found. Pt initially somnolent, bradycardic, and hypotensive to 73/60 for Ems, improved to 90/60s after 500 cc of IVF  
106